# Patient Record
Sex: MALE | Employment: FULL TIME | URBAN - METROPOLITAN AREA
[De-identification: names, ages, dates, MRNs, and addresses within clinical notes are randomized per-mention and may not be internally consistent; named-entity substitution may affect disease eponyms.]

---

## 2017-01-06 ENCOUNTER — OFFICE VISIT - HEALTHEAST (OUTPATIENT)
Dept: FAMILY MEDICINE | Facility: CLINIC | Age: 42
End: 2017-01-06

## 2017-01-06 DIAGNOSIS — R05.9 COUGH: ICD-10-CM

## 2017-01-09 ENCOUNTER — COMMUNICATION - HEALTHEAST (OUTPATIENT)
Dept: FAMILY MEDICINE | Facility: CLINIC | Age: 42
End: 2017-01-09

## 2017-04-29 ENCOUNTER — COMMUNICATION - HEALTHEAST (OUTPATIENT)
Dept: FAMILY MEDICINE | Facility: CLINIC | Age: 42
End: 2017-04-29

## 2017-04-29 DIAGNOSIS — J34.89 RHINORRHEA: ICD-10-CM

## 2017-11-14 ENCOUNTER — OFFICE VISIT - HEALTHEAST (OUTPATIENT)
Dept: FAMILY MEDICINE | Facility: CLINIC | Age: 42
End: 2017-11-14

## 2017-11-14 DIAGNOSIS — J30.9 ALLERGIC RHINITIS: ICD-10-CM

## 2017-11-14 DIAGNOSIS — Z00.00 ROUTINE GENERAL MEDICAL EXAMINATION AT A HEALTH CARE FACILITY: ICD-10-CM

## 2017-11-14 DIAGNOSIS — E66.09 CLASS 1 OBESITY DUE TO EXCESS CALORIES WITH SERIOUS COMORBIDITY AND BODY MASS INDEX (BMI) OF 32.0 TO 32.9 IN ADULT: ICD-10-CM

## 2017-11-14 DIAGNOSIS — R05.9 COUGH: ICD-10-CM

## 2017-11-14 DIAGNOSIS — K21.9 GERD (GASTROESOPHAGEAL REFLUX DISEASE): ICD-10-CM

## 2017-11-14 DIAGNOSIS — E80.6 HYPERBILIRUBINEMIA: ICD-10-CM

## 2017-11-14 DIAGNOSIS — M25.473 ANKLE SWELLING: ICD-10-CM

## 2017-11-14 DIAGNOSIS — E66.811 CLASS 1 OBESITY DUE TO EXCESS CALORIES WITH SERIOUS COMORBIDITY AND BODY MASS INDEX (BMI) OF 32.0 TO 32.9 IN ADULT: ICD-10-CM

## 2017-11-14 LAB
CHOLEST SERPL-MCNC: 171 MG/DL
FASTING STATUS PATIENT QL REPORTED: YES
HDLC SERPL-MCNC: 39 MG/DL
LDLC SERPL CALC-MCNC: 107 MG/DL
TRIGL SERPL-MCNC: 125 MG/DL

## 2017-11-14 ASSESSMENT — MIFFLIN-ST. JEOR: SCORE: 1777.58

## 2017-11-15 ENCOUNTER — COMMUNICATION - HEALTHEAST (OUTPATIENT)
Dept: FAMILY MEDICINE | Facility: CLINIC | Age: 42
End: 2017-11-15

## 2017-12-08 ENCOUNTER — COMMUNICATION - HEALTHEAST (OUTPATIENT)
Dept: TELEHEALTH | Facility: CLINIC | Age: 42
End: 2017-12-08

## 2017-12-08 ENCOUNTER — OFFICE VISIT - HEALTHEAST (OUTPATIENT)
Dept: ALLERGY | Facility: CLINIC | Age: 42
End: 2017-12-08

## 2017-12-08 DIAGNOSIS — R68.2 DRY MOUTH: ICD-10-CM

## 2017-12-08 DIAGNOSIS — R09.81 NASAL CONGESTION: ICD-10-CM

## 2017-12-08 DIAGNOSIS — R05.9 COUGH: ICD-10-CM

## 2017-12-08 ASSESSMENT — MIFFLIN-ST. JEOR: SCORE: 1777.58

## 2019-12-18 ENCOUNTER — OFFICE VISIT - HEALTHEAST (OUTPATIENT)
Dept: FAMILY MEDICINE | Facility: CLINIC | Age: 44
End: 2019-12-18

## 2019-12-18 ENCOUNTER — COMMUNICATION - HEALTHEAST (OUTPATIENT)
Dept: TELEHEALTH | Facility: CLINIC | Age: 44
End: 2019-12-18

## 2019-12-18 DIAGNOSIS — R07.89 ATYPICAL CHEST PAIN: ICD-10-CM

## 2019-12-18 DIAGNOSIS — K21.9 GASTROESOPHAGEAL REFLUX DISEASE WITHOUT ESOPHAGITIS: ICD-10-CM

## 2019-12-18 DIAGNOSIS — E66.09 CLASS 1 OBESITY DUE TO EXCESS CALORIES WITHOUT SERIOUS COMORBIDITY WITH BODY MASS INDEX (BMI) OF 30.0 TO 30.9 IN ADULT: ICD-10-CM

## 2019-12-18 DIAGNOSIS — E66.811 CLASS 1 OBESITY DUE TO EXCESS CALORIES WITHOUT SERIOUS COMORBIDITY WITH BODY MASS INDEX (BMI) OF 30.0 TO 30.9 IN ADULT: ICD-10-CM

## 2019-12-18 DIAGNOSIS — E78.5 DYSLIPIDEMIA: ICD-10-CM

## 2019-12-18 DIAGNOSIS — G43.109 MIGRAINE WITH AURA AND WITHOUT STATUS MIGRAINOSUS, NOT INTRACTABLE: ICD-10-CM

## 2019-12-18 DIAGNOSIS — E80.6 HYPERBILIRUBINEMIA: ICD-10-CM

## 2019-12-18 DIAGNOSIS — Z00.00 ROUTINE GENERAL MEDICAL EXAMINATION AT A HEALTH CARE FACILITY: ICD-10-CM

## 2019-12-18 DIAGNOSIS — R41.3 MEMORY LOSS: ICD-10-CM

## 2019-12-18 LAB
ALBUMIN SERPL-MCNC: 4.5 G/DL (ref 3.5–5)
ALP SERPL-CCNC: 79 U/L (ref 45–120)
ALT SERPL W P-5'-P-CCNC: 16 U/L (ref 0–45)
ANION GAP SERPL CALCULATED.3IONS-SCNC: 9 MMOL/L (ref 5–18)
AST SERPL W P-5'-P-CCNC: 14 U/L (ref 0–40)
BILIRUB SERPL-MCNC: 2.8 MG/DL (ref 0–1)
BUN SERPL-MCNC: 16 MG/DL (ref 8–22)
CALCIUM SERPL-MCNC: 9.6 MG/DL (ref 8.5–10.5)
CHLORIDE BLD-SCNC: 102 MMOL/L (ref 98–107)
CHOLEST SERPL-MCNC: 148 MG/DL
CO2 SERPL-SCNC: 28 MMOL/L (ref 22–31)
CREAT SERPL-MCNC: 0.82 MG/DL (ref 0.7–1.3)
ERYTHROCYTE [DISTWIDTH] IN BLOOD BY AUTOMATED COUNT: 12.1 % (ref 11–14.5)
FASTING STATUS PATIENT QL REPORTED: YES
GFR SERPL CREATININE-BSD FRML MDRD: >60 ML/MIN/1.73M2
GLUCOSE BLD-MCNC: 85 MG/DL (ref 70–125)
HCT VFR BLD AUTO: 48.8 % (ref 40–54)
HDLC SERPL-MCNC: 42 MG/DL
HGB BLD-MCNC: 17.2 G/DL (ref 14–18)
LDLC SERPL CALC-MCNC: 91 MG/DL
MCH RBC QN AUTO: 33.3 PG (ref 27–34)
MCHC RBC AUTO-ENTMCNC: 35.2 G/DL (ref 32–36)
MCV RBC AUTO: 95 FL (ref 80–100)
PLATELET # BLD AUTO: 141 THOU/UL (ref 140–440)
PMV BLD AUTO: 9.5 FL (ref 7–10)
POTASSIUM BLD-SCNC: 4.1 MMOL/L (ref 3.5–5)
PROT SERPL-MCNC: 7.4 G/DL (ref 6–8)
RBC # BLD AUTO: 5.16 MILL/UL (ref 4.4–6.2)
SODIUM SERPL-SCNC: 139 MMOL/L (ref 136–145)
TRIGL SERPL-MCNC: 76 MG/DL
TSH SERPL DL<=0.005 MIU/L-ACNC: 0.89 UIU/ML (ref 0.3–5)
VIT B12 SERPL-MCNC: 301 PG/ML (ref 213–816)
WBC: 6.2 THOU/UL (ref 4–11)

## 2019-12-18 ASSESSMENT — MIFFLIN-ST. JEOR: SCORE: 1727.1

## 2019-12-19 LAB
ATRIAL RATE - MUSE: 52 BPM
DIASTOLIC BLOOD PRESSURE - MUSE: NORMAL
HIV 1+2 AB+HIV1 P24 AG SERPL QL IA: NEGATIVE
INTERPRETATION ECG - MUSE: NORMAL
P AXIS - MUSE: 26 DEGREES
PR INTERVAL - MUSE: 178 MS
QRS DURATION - MUSE: 92 MS
QT - MUSE: 440 MS
QTC - MUSE: 409 MS
R AXIS - MUSE: 41 DEGREES
SYSTOLIC BLOOD PRESSURE - MUSE: NORMAL
T AXIS - MUSE: 9 DEGREES
VENTRICULAR RATE- MUSE: 52 BPM

## 2020-04-24 ENCOUNTER — COMMUNICATION - HEALTHEAST (OUTPATIENT)
Dept: FAMILY MEDICINE | Facility: CLINIC | Age: 45
End: 2020-04-24

## 2020-04-24 ENCOUNTER — OFFICE VISIT - HEALTHEAST (OUTPATIENT)
Dept: FAMILY MEDICINE | Facility: CLINIC | Age: 45
End: 2020-04-24

## 2020-04-24 DIAGNOSIS — L73.9 FOLLICULITIS: ICD-10-CM

## 2020-07-08 ENCOUNTER — COMMUNICATION - HEALTHEAST (OUTPATIENT)
Dept: SCHEDULING | Facility: CLINIC | Age: 45
End: 2020-07-08

## 2020-07-09 ENCOUNTER — OFFICE VISIT - HEALTHEAST (OUTPATIENT)
Dept: FAMILY MEDICINE | Facility: CLINIC | Age: 45
End: 2020-07-09

## 2020-07-09 DIAGNOSIS — M79.89 SWELLING OF LIMB: ICD-10-CM

## 2020-07-09 LAB
ALBUMIN SERPL-MCNC: 4.2 G/DL (ref 3.5–5)
ALP SERPL-CCNC: 79 U/L (ref 45–120)
ALT SERPL W P-5'-P-CCNC: 23 U/L (ref 0–45)
ANION GAP SERPL CALCULATED.3IONS-SCNC: 8 MMOL/L (ref 5–18)
AST SERPL W P-5'-P-CCNC: 18 U/L (ref 0–40)
BILIRUB SERPL-MCNC: 1.4 MG/DL (ref 0–1)
BUN SERPL-MCNC: 14 MG/DL (ref 8–22)
C REACTIVE PROTEIN LHE: 0.1 MG/DL (ref 0–0.8)
CALCIUM SERPL-MCNC: 9.4 MG/DL (ref 8.5–10.5)
CHLORIDE BLD-SCNC: 103 MMOL/L (ref 98–107)
CO2 SERPL-SCNC: 28 MMOL/L (ref 22–31)
CREAT SERPL-MCNC: 0.81 MG/DL (ref 0.7–1.3)
ERYTHROCYTE [SEDIMENTATION RATE] IN BLOOD BY WESTERGREN METHOD: 2 MM/HR (ref 0–15)
GFR SERPL CREATININE-BSD FRML MDRD: >60 ML/MIN/1.73M2
GLUCOSE BLD-MCNC: 95 MG/DL (ref 70–125)
POTASSIUM BLD-SCNC: 4.1 MMOL/L (ref 3.5–5)
PROT SERPL-MCNC: 7.1 G/DL (ref 6–8)
RHEUMATOID FACT SERPL-ACNC: <15 IU/ML (ref 0–30)
SODIUM SERPL-SCNC: 139 MMOL/L (ref 136–145)

## 2020-12-29 ENCOUNTER — OFFICE VISIT - HEALTHEAST (OUTPATIENT)
Dept: FAMILY MEDICINE | Facility: CLINIC | Age: 45
End: 2020-12-29

## 2020-12-29 DIAGNOSIS — Z00.00 ROUTINE HISTORY AND PHYSICAL EXAMINATION OF ADULT: ICD-10-CM

## 2020-12-29 DIAGNOSIS — K21.9 GASTROESOPHAGEAL REFLUX DISEASE WITHOUT ESOPHAGITIS: ICD-10-CM

## 2020-12-29 DIAGNOSIS — E78.5 DYSLIPIDEMIA: ICD-10-CM

## 2020-12-29 DIAGNOSIS — E80.6 HYPERBILIRUBINEMIA: ICD-10-CM

## 2020-12-29 DIAGNOSIS — Z11.59 NEED FOR HEPATITIS C SCREENING TEST: ICD-10-CM

## 2020-12-29 LAB
ALBUMIN SERPL-MCNC: 4.3 G/DL (ref 3.5–5)
ALP SERPL-CCNC: 68 U/L (ref 45–120)
ALT SERPL W P-5'-P-CCNC: 21 U/L (ref 0–45)
ANION GAP SERPL CALCULATED.3IONS-SCNC: 10 MMOL/L (ref 5–18)
AST SERPL W P-5'-P-CCNC: 15 U/L (ref 0–40)
BILIRUB SERPL-MCNC: 1.5 MG/DL (ref 0–1)
BUN SERPL-MCNC: 28 MG/DL (ref 8–22)
CALCIUM SERPL-MCNC: 9.1 MG/DL (ref 8.5–10.5)
CHLORIDE BLD-SCNC: 106 MMOL/L (ref 98–107)
CHOLEST SERPL-MCNC: 172 MG/DL
CO2 SERPL-SCNC: 28 MMOL/L (ref 22–31)
CREAT SERPL-MCNC: 0.91 MG/DL (ref 0.7–1.3)
ERYTHROCYTE [DISTWIDTH] IN BLOOD BY AUTOMATED COUNT: 12 % (ref 11–14.5)
FASTING STATUS PATIENT QL REPORTED: YES
GFR SERPL CREATININE-BSD FRML MDRD: >60 ML/MIN/1.73M2
GLUCOSE BLD-MCNC: 91 MG/DL (ref 70–125)
HCT VFR BLD AUTO: 51.6 % (ref 40–54)
HDLC SERPL-MCNC: 45 MG/DL
HGB BLD-MCNC: 17.3 G/DL (ref 14–18)
LDLC SERPL CALC-MCNC: 109 MG/DL
MCH RBC QN AUTO: 32.4 PG (ref 27–34)
MCHC RBC AUTO-ENTMCNC: 33.4 G/DL (ref 32–36)
MCV RBC AUTO: 97 FL (ref 80–100)
PLATELET # BLD AUTO: 168 THOU/UL (ref 140–440)
PMV BLD AUTO: 9.1 FL (ref 7–10)
POTASSIUM BLD-SCNC: 4.2 MMOL/L (ref 3.5–5)
PROT SERPL-MCNC: 7.3 G/DL (ref 6–8)
RBC # BLD AUTO: 5.33 MILL/UL (ref 4.4–6.2)
SODIUM SERPL-SCNC: 144 MMOL/L (ref 136–145)
TRIGL SERPL-MCNC: 92 MG/DL
WBC: 6.2 THOU/UL (ref 4–11)

## 2020-12-29 ASSESSMENT — MIFFLIN-ST. JEOR: SCORE: 1767.93

## 2020-12-30 LAB — HCV AB SERPL QL IA: NEGATIVE

## 2021-04-19 ENCOUNTER — AMBULATORY - HEALTHEAST (OUTPATIENT)
Dept: NURSING | Facility: CLINIC | Age: 46
End: 2021-04-19

## 2021-05-10 ENCOUNTER — AMBULATORY - HEALTHEAST (OUTPATIENT)
Dept: NURSING | Facility: CLINIC | Age: 46
End: 2021-05-10

## 2021-05-26 ENCOUNTER — RECORDS - HEALTHEAST (OUTPATIENT)
Dept: ADMINISTRATIVE | Facility: CLINIC | Age: 46
End: 2021-05-26

## 2021-05-30 VITALS — WEIGHT: 195.38 LBS

## 2021-05-31 VITALS — BODY MASS INDEX: 32.49 KG/M2 | WEIGHT: 207 LBS | HEIGHT: 67 IN

## 2021-05-31 VITALS — HEIGHT: 67 IN | BODY MASS INDEX: 32.49 KG/M2 | WEIGHT: 207 LBS

## 2021-06-03 VITALS
DIASTOLIC BLOOD PRESSURE: 60 MMHG | SYSTOLIC BLOOD PRESSURE: 110 MMHG | HEIGHT: 67 IN | OXYGEN SATURATION: 98 % | BODY MASS INDEX: 30.61 KG/M2 | HEART RATE: 67 BPM | WEIGHT: 195 LBS

## 2021-06-04 VITALS
HEART RATE: 53 BPM | BODY MASS INDEX: 30.24 KG/M2 | WEIGHT: 194.5 LBS | SYSTOLIC BLOOD PRESSURE: 129 MMHG | OXYGEN SATURATION: 97 % | TEMPERATURE: 97.7 F | DIASTOLIC BLOOD PRESSURE: 85 MMHG

## 2021-06-04 NOTE — PROGRESS NOTES
"     Assessment/Plan:     1. Routine general medical examination at a health care facility  Routine healthcare maintenance.  Preventative cares reviewed.  Routine HIV screen based on age criteria.  Tetanus booster provided today.  Declines flu shot.  Annual physical exams to continue.  - HIV Antigen/Antibody Screening Cascade  - Td, Preservative Free (green label)    2. Class 1 obesity due to excess calories without serious comorbidity with body mass index (BMI) of 30.0 to 30.9 in adult  Patient has lost 12 pounds since November 14, 2017 and is exercising more consistently.  Dietary and exercise modification for weight goal less than 190 pounds initially, less than 180 pounds ideally.  - Lipid Cascade    3. Atypical chest pain  Atypical chest pain.  Mid chest.  Question musculoskeletal etiology.  Patient declines chest x-ray.  Electrocardiogram appears unremarkable other than ventricular rate of 52 bpm.  Will notify of persistent concerns or recurrence.  Please see comments below regarding reflux management.  - Electrocardiogram Perform and Read  - HM2(CBC w/o Differential)  - Comprehensive Metabolic Panel    4. Gastroesophageal reflux disease without esophagitis  Patient has used Tums OTC as needed.  Will use omeprazole 20 mg daily x14 days then as needed basis following.  Notify of atypical chest pain as noted above persist.    5. Hyperbilirubinemia  History of hyperbilirubinemia with bilirubin level around 2.3.  Repeat labs as noted to ensure stable or improved with likely Gilbert's syndrome.    6. Dyslipidemia  History of dyslipidemia noted.  Check lipid cascade today while fasting.  Therapeutic lifestyle changes reviewed as noted above.  - Lipid [place]    7. Migraine with aura and without status migrainosus, not intractable  Migraine with aura.  Described as \"ocular migraine\".  No significant nausea or vomiting.  Headaches not intractable.  Will monitor currently and keep headache diary.    8. Memory " "loss  Some memory loss issues.  CBC, comprehensive metabolic panel, vitamin B12 and TSH pending.  Will notify with results.  Patient declines further evaluation by neurologist.  - Vitamin B12  - Thyroid Stimulating Hormone (TSH)       I have had an Advance Directives discussion with the patient.  The following high BMI interventions were performed this visit: encouragement to exercise, weight monitoring, weight loss from baseline weight and lifestyle education regarding diet.  Ensure ongoing efforts to achieve weight goal < 190 pounds initially, < 180 pounds ideally.              Subjective:      Gomez Villegas is a 44 y.o. male who presents for an annual exam.  Has had some memory issues.  Forgetful that they have done something like feed the dog.  About 5 episodes in the last month.  Has had some ocular migraine headache concerns as well.  Does not require significant analgesics however.  Symptoms typically self-limited after about 30 minutes of visual acuity change.  Not constant.  Does have atypical chest pain more mid chest.  Does not move around.  Is just \"there\".  Not worse with exertion.  Can get heart rate up to 194 bpm while working out at the gym.  Uses Tums tablets for reflux management.  Had evaluation for possible allergies however allergy testing was normal.  Cough likely secondary to reflux per Dr. Rachael Marc.  Comprehensive review of systems as above otherwise all negative.     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  Allergy testing was negative.  I " think his cough is likely due to his reflux.  If symptoms do not improve, could consider ENT. Thank you, Rachael Marc    Healthy Habits:   Regular Exercise: Yes  Healthy Diet: Yes  Dental Visits Regularly: Yes  Seat Belt: Yes   Sexually active: Yes  Colonoscopy: N/A  Lipid Profile: Yes  Glucose Screen: Yes    Immunization History   Administered Date(s) Administered     DT (pediatric) 03/16/2004     Hep B, historic 05/06/2011     Td,adult,historic,unspecified 03/16/2004     Tdap 06/16/2010     Immunization status: needs Td booster, declines flu shot.  Vision Screening:both eyes  Hearing: PASS     Current Outpatient Medications   Medication Sig Dispense Refill     fluticasone (FLONASE) 50 mcg/actuation nasal spray SHAKE LIQUID AND USE 2 SPRAYS IN EACH NOSTRIL DAILY 48 g 1     No current facility-administered medications for this visit.      History reviewed. No pertinent past medical history.  History reviewed. No pertinent surgical history.  Patient has no known allergies.  No family history on file.  Social History     Socioeconomic History     Marital status:      Spouse name: Not on file     Number of children: Not on file     Years of education: Not on file     Highest education level: Not on file   Occupational History     Not on file   Social Needs     Financial resource strain: Not on file     Food insecurity:     Worry: Not on file     Inability: Not on file     Transportation needs:     Medical: Not on file     Non-medical: Not on file   Tobacco Use     Smoking status: Never Smoker     Smokeless tobacco: Never Used   Substance and Sexual Activity     Alcohol use: No     Drug use: Not on file     Sexual activity: Not on file   Lifestyle     Physical activity:     Days per week: Not on file     Minutes per session: Not on file     Stress: Not on file   Relationships     Social connections:     Talks on phone: Not on file     Gets together: Not on file     Attends [employer] service: Not on file      "Active member of club or organization: Not on file     Attends meetings of clubs or organizations: Not on file     Relationship status: Not on file     Intimate partner violence:     Fear of current or ex partner: Not on file     Emotionally abused: Not on file     Physically abused: Not on file     Forced sexual activity: Not on file   Other Topics Concern     Not on file   Social History Narrative     Not on file       Review of Systems  Comprehensive ROS: as above, otherwise all negative.           Objective:     /60   Pulse 67   Ht 5' 7.25\" (1.708 m)   Wt 195 lb (88.5 kg)   SpO2 98%   BMI 30.31 kg/m    Body mass index is 30.31 kg/m .    Physical    General Appearance:    Alert, cooperative, no distress, appears stated age.  Obesity with BMI 30.31   Head:    Normocephalic, without obvious abnormality, atraumatic   Eyes:    PERRL, conjunctiva/corneas clear, EOM's intact, fundi     benign, both eyes        Ears:    Normal TM's and external ear canals, both ears   Nose:   Nares normal, septum midline, mucosa normal, no drainage    or sinus tenderness   Throat:   Lips, mucosa, and tongue normal; teeth and gums normal   Neck:   Supple, symmetrical, trachea midline, no adenopathy;        thyroid:  No enlargement/tenderness/nodules; no carotid    bruit or JVD   Back:     Symmetric, no curvature, ROM normal, no CVA tenderness   Lungs:     Clear to auscultation bilaterally, respirations unlabored   Chest wall:    No tenderness or deformity   Heart:    Regular rate and rhythm, S1 and S2 normal, no murmur, rub   or gallop   Abdomen:     Soft, non-tender, bowel sounds active all four quadrants,     no masses, no organomegaly.     Genitalia:    Normal male without lesion, discharge or tenderness.  No inguinal hernia noted.     Rectal:    deferred   Extremities:   Extremities normal, atraumatic, no cyanosis or edema   Pulses:   2+ and symmetric all extremities   Skin:   Skin color, texture, turgor normal, no rashes " or lesions   Lymph nodes:   Cervical, supraclavicular, and axillary nodes normal   Neurologic:   CNII-XII intact. Normal strength, sensation and reflexes       throughout       Electrocardiogram with sinus bradycardia with ventricular rate 52 bpm otherwise normal EKG.           This note has been dictated using voice recognition software and as a result may contain minor grammatical errors and unintended word substitutions.    ambulatory

## 2021-06-05 VITALS
DIASTOLIC BLOOD PRESSURE: 60 MMHG | HEIGHT: 67 IN | HEART RATE: 59 BPM | WEIGHT: 204 LBS | SYSTOLIC BLOOD PRESSURE: 110 MMHG | BODY MASS INDEX: 32.02 KG/M2 | TEMPERATURE: 97.7 F | OXYGEN SATURATION: 96 %

## 2021-06-07 NOTE — PROGRESS NOTES
"Gomez Villegas is a 45 y.o. male who is being evaluated via a billable video visit.      The patient has been notified of following:     \"This video visit will be conducted via a call between you and your physician/provider. We have found that certain health care needs can be provided without the need for an in-person physical exam.  This service lets us provide the care you need with a video conversation.  If a prescription is necessary we can send it directly to your pharmacy.  If lab work is needed we can place an order for that and you can then stop by our lab to have the test done at a later time.    Video visits are billed at different rates depending on your insurance coverage. Please reach out to your insurance provider with any questions.    If during the course of the call the physician/provider feels a video visit is not appropriate, you will not be charged for this service.\"    Patient has given verbal consent to a Video visit? Yes    Patient would like to receive their AVS by AVS Preference: Sydnee.    Patient would like the video invitation sent by: Text to cell phone: [phone]    Will anyone else be joining your video visit? No        Video Start Time: 1:54 pm    Assessment/Plan:    1. Folliculitis  We discussed potential etiologies of the rash.  From what I can see on video visit, it does not appear classic for shingles.  The description of the clinical course also does not seem to reflect shingles, although this is still a possibility.  I am more suspicious of a folliculitis type of lesion given the location and appearance of the rash.  Will treat with a seven-day course of doxycycline 100 mg by mouth twice daily.  He will follow-up in 1-2 weeks if he has any persisting or worsening symptoms.  - doxycycline (MONODOX) 100 MG capsule; Take 1 capsule (100 mg total) by mouth 2 (two) times a day for 7 days.  Dispense: 14 capsule; Refill: 0      Subjective:    Gomez Villegas is a 45 year old male here " "today for a viseo visit to discuss rash and irritation on his neck.  Patient first noticed an irritation on the right side of his neck about 4 days ago.  Initially thought he had a reaction to something outside.  He noticed irritation and some discomfort when his shirt collar or \"would rub against it.  It is somewhat red with some raised bumps.  Occasionally is itchy but not very painful unless something is rubbing against it.  He describes small bumps that resemble pimples.  He denies having rash or lesions similar to this elsewhere on his body.  He is not having any fevers body aches or other systemic symptoms.  He tried using some topical hydrocortisone cream with little relief.  The area of the rash is not spreading but is also not improving at all.  Patient notes having history of shingles.  This rash does not appear as shingles did for him.  He describes large blisters when he had shingles on his back years ago.  He does have raised bumps but it seems different than his shingles to him.  Review of systems is as stated in HPI, and the remainder of the 10 system review is otherwise unremarkable.    Past Medical History, Family History, and Social History reviewed.    No past surgical history on file.     No family history on file.     No past medical history on file.     Social History     Tobacco Use     Smoking status: Never Smoker     Smokeless tobacco: Never Used   Substance Use Topics     Alcohol use: No     Drug use: Not on file        Current Outpatient Medications   Medication Sig Dispense Refill     doxycycline (MONODOX) 100 MG capsule Take 1 capsule (100 mg total) by mouth 2 (two) times a day for 7 days. 14 capsule 0     No current facility-administered medications for this visit.         Video-Visit Details    Type of service:  Video Visit    Video End Time (time video stopped): 2:08 PM  Originating Location (pt. Location): Home    Distant Location (provider location):  [facility] MEDICINE/OB "     Mode of Communication:  Video Conference via [employer]      Summer Gutiérrez, CNP

## 2021-06-08 NOTE — PROGRESS NOTES
Assessment  1. Cough  XR Chest PA and Lateral    Bordetella pertussis PCR    albuterol nebulizer solution 3 mL (PROVENTIL)    Nebulizer       Plan    1.  Cough: Chest x-ray performed in clinic and was negative.  Pertussis swab obtained.  Patient received albuterol nebulizer treatment in clinic.  No significant change in cough or lung exam following nebulizer treatment.  Considered differential diagnosis of GERD, asthma, infection and post-nasal drainage as etiology of his cough.  He is not complaining of any acid reflux symptoms.  Does not have significant improvement with albuterol nebulizer treatment in clinic.  Did not respond to antibiotic treatment and no sign of acute infection on exam today.  Discussed with patient that I suspect that his cough is caused by some sinus drainage.  He does have some redness and swelling of the nasal mucosa and does complain of occasional rhinorrhea and postnasal drainage.  Recommend trial of fluticasone nasal spray.  Prescription was sent to pharmacy.  Counseled on use of medication.  Also suggested using a humidifier at home to see if this helps reduce his symptoms.  Follow up with primary care provider as needed if cough does not improve with these measures.      Subjective  Gomez Villegas is a 41 y.o. male who comes in today with concern about ongoing cough.  Reports that cough started about 8 weeks ago.  Initially had a little bit of a head cold with some congestion at first.  That has resolved.  Cough has persisted.  He continues to have some occasional rhinorrhea and postnasal drainage.  This morning his right ear feels like there is fluid within it.  He has not had fevers or chills.  No sore throat.  No sinus pain or pressure.  Cough is productive of some phlegm.  He has no dyspnea.  No chest tightness or wheezing.  He is not a smoker.  He does report that his primary care provider diagnosed him with exercise-induced asthma several years ago.  However it has not been a  significant problem for him and he does not have an inhaler at home.  He did come into clinic for evaluation about 3 weeks ago in regards to this cough.  Was treated empirically with azithromycin and Tessalon Perles.  Completed those medications and did not feel they helped improve his symptoms.  He reports no history of environmental allergies or other exposures.  No family members are sick at home with similar symptoms.  He denies symptoms of esophageal reflux.  We reviewed allergies and medications.  He has no other concerns or questions today.    Current Outpatient Prescriptions   Medication Sig Dispense Refill     fluticasone (FLONASE) 50 mcg/actuation nasal spray 2 sprays into each nostril daily. 16 g 2     No current facility-administered medications for this visit.          Objective   Visit Vitals     /68 (Patient Site: Left Arm, Patient Position: Sitting, Cuff Size: Adult Large)     Pulse 68     Temp 98.9  F (37.2  C) (Tympanic)     Wt 195 lb 6 oz (88.6 kg)     SpO2 96%         Gen: alert, no acute distress  HEENT;  NCAT, TMs normal, erythema of nasal mucosa,  OP clear  Neck: supple, no lymphadenopathy, thyroid normal  CV: RRR, no murmur  Resp: CTAB, no wheeze/crackles  Ext: warm, dry, no edema      Results: Chest x-ray was performed in clinic.  I personally reviewed this chest x-ray.  No evidence of infiltrate or other cardiopulmonary abnormalities present.

## 2021-06-09 NOTE — PROGRESS NOTES
Assessment:   1. Swelling of limb    Edema secondary to to possible allergic reaction. Discussed diagnosis with patient and plan to obtain more laboratory studies.   - Sedimentation Rate  - C-Reactive Protein (CRP)  - Comprehensive Metabolic Panel  - Rheumatoid Factor Quant  - methylPREDNISolone (MEDROL DOSEPACK) 4 mg tablet; Take 1 tablet (4 mg total) by mouth daily for 6 days. Follow package directions  Dispense: 21 tablet; Refill: 0    Plan:   Recommendations: decrease sodium in the diet.  The patient was also instructed to call IMMEDIATELY (i.e., day or night) if any cardiopulmonary symptoms occur, especially chest pain, shortness of breath, dyspnea on exertion, paroxysmal nocturnal dyspnea, or orthopnea, and these were explained.  Follow up in 5 days and as needed.     Subjective:   Gomez Villegas is a 45 y.o. male who presents for evaluation of edema in his both arm. The edema has been mild to moderate. Onset of symptoms was 3 days ago, and patient reports symptoms have hsa remained the same since that time. The edema is present all day. The patient states the problem is new. The swelling has been aggravated by nothing. He reports that he works as jobs[employer] and he has worked in that same place before. Associated factors include: nothing. Cardiac risk factors: none.    The following portions of the patient's history were reviewed and updated as appropriate: allergies, current medications, past family history, past medical history, past social history, past surgical history and problem list.    Review of Systems  A 12 point comprehensive review of systems was negative except as noted.     Objective:   /85   Pulse (!) 53   Temp 97.7  F (36.5  C) (Oral)   Wt 194 lb 8 oz (88.2 kg)   SpO2 97%   BMI 30.24 kg/m    General appearance: alert, appears stated age and cooperative  Head: Normocephalic, without obvious abnormality, atraumatic  Eyes: conjunctivae/corneas clear. PERRL, EOM's intact. Fundi  benign.  Lungs: clear to auscultation bilaterally  Heart: regular rate and rhythm, S1, S2 normal, no murmur, click, rub or gallop  Extremities: edema of both hands mostly on the right hand  Pulses: 2+ and symmetric  Skin: Skin color, texture, turgor normal. No rashes or lesions  Lymph nodes: Cervical, supraclavicular, and axillary nodes normal.  Neurologic: Grossly normal

## 2021-06-09 NOTE — TELEPHONE ENCOUNTER
RN Triage:    Works outside with tools.    Noticed bilateral mild-mod hand swelling yesterday.  Does not interfere with range of motion.  Is able to  objects as usual.  Ring is tight.  No breathing difficulty.  Denies skin discoloration or pain.  No precipitating cause that he is aware of.  Appointment scheduled in clinic tomorrow.    Prisca Vega RN    [employer]          Reason for Disposition    MODERATE hand swelling (e.g., visible swelling of hand and fingers; pitting edema)    Additional Information    Negative: Severe difficulty breathing (e.g., struggling for each breath, speaks in single words)    Negative: Sounds like a life-threatening emergency to the triager    Negative: Followed a hand injury    Negative: Swelling followed an insect bite to the area    Negative: Swelling followed a bee, wasp, or other sting to the area    Negative: Swelling of arm is main problem    Negative: Swelling of wrist is main symptom    Negative: Cast problems or questions    Negative: Pain, redness, swelling, or pus at IV Site    Negative: Difficulty breathing at rest    Negative: Looks like a broken bone or dislocated joint (e.g., crooked or deformed)    Negative: Entire hand is cool or blue in comparison to other side    Negative: [1] Can't use hand or can barely use hand AND [2] new onset    Negative: [1] Difficulty breathing with exertion (e.g., walking) AND [2] new onset or worsening    Negative: [1] Red area or streak AND [2] fever    Negative: [1] Swelling is painful to touch AND [2] fever    Negative: [1] Cast arm AND [2] now increased pain    Negative: [1] Postpartum (i.e. < 6 weeks since delivery) AND [2] new blurred vision or vision changes    Negative: [1] Postpartum (i.e. < 6 weeks since delivery) AND [2] face swelling    Negative: Patient sounds very sick or weak to the triager    Negative: [1] Pregnant > 20 weeks AND [2] face swelling    Negative: [1] Pregnant > 20 weeks AND [2] new  blurred vision or vision changes    Negative: SEVERE hand swelling (e.g., swelling of entire hand and up into forearm)    Negative: [1] Red area or streak [2] large (> 2 in. or 5 cm)    Protocols used: HAND SWELLING-A-AH

## 2021-06-14 NOTE — PROGRESS NOTES
"Chief complaint: Cough    History of present illness: This is a pleasant 42-year-old gentleman who is here today for evaluation of cough.  He states he has had the cough every fall for the last few years.  He describes the cough is hacking.  He states he does not cough to the point of throwing up or gagging himself.  He has no shortness of breath or wheeze with the cough.  He was given a Ventolin inhaler in the past to use with exercise.  He tried it with the cough that he did not make much of a difference in symptoms.  He denies any cough in his sleep.  He describes the cough is coming more from his throat.  It worsens with talking.  He feels that his mouth is dry.  Occasionally has dry eyes as well.  He states the cough drops or gum seem to relieve the symptoms.  He has been taking Flonase nasal spray for the last year but is not sure it is helping.  He does have a history of reflux and was started on Prilosec.  He is not using the medication 30 minutes prior to eating, however.  He is noted no improvement in the symptoms since starting the reflux medication.    Past medical history: Otherwise unremarkable    Social history: He lives in a home that was built in 1964, has radiator heat, no pets, no central air, no visible mold, non-smoker    Family history: Negative for allergies and asthma    Review of Systems performed as above and the remainder is negative.      Current Outpatient Prescriptions:      fluticasone (FLONASE) 50 mcg/actuation nasal spray, SHAKE LIQUID AND USE 2 SPRAYS IN EACH NOSTRIL DAILY, Disp: 48 g, Rfl: 1    No Known Allergies    /80  Pulse 80  Resp 17  Ht 5' 7\" (1.702 m)  Wt 207 lb (93.9 kg)  BMI 32.42 kg/m2  Gen: Pleasant male not in acute distress  HEENT: Eyes no erythema of the bulbar or palpebral conjunctiva, no edema. Ears: TMs well visualized, no effusions. Nose: No congestion, mucosa normal. Mouth: Throat clear, no lip or tongue edema.   Cardiac: Regular rate and rhythm, no " murmurs, rubs or gallops  Respiratory: Clear to auscultation bilaterally, no adventitious breath sounds  Lymph: No supraclavicular or cervical lymphadenopathy  Skin: No rashes or lesions  Psych: Alert and oriented times 3    Last Percutaneous Allergy Test Results  Trees  Savage, White  1:20 H  (W/F in mm): 0 (12/08/17 1553)  Birch Mix 1:20 H (W/F in mm): 0 (12/08/17 1553)  [place], Common 1:20 H (W/F in mm): 0 (12/08/17 1553)  Elm, American 1:20 H (W/F in mm): 0 (12/08/17 1553)  Franklin, Shagbark 1:20 H (W/F in mm): 0 (12/08/17 1553)  Maple, Hard/Sugar 1:20 H (W/F in mm): 0 (12/08/17 1553)  Whiting Mix 1:20 H (W/F in mm): 0 (12/08/17 1553)  [place], Red 1:20 H (W/F in mm): 0 (12/08/17 1553)  Maybee, American 1:20 H (W/F in mm): 0 (12/08/17 1553)  [place] Tree 1:20 H (W/F in mm): 0 (12/08/17 1553)  Dust Mites  D. Pteronyssinus Mite 30,000 AU/ML H (W/F in mm): 0 (12/08/17 1553)  D. Farinae Mite 30,000 AU/ML H (W/F in mm: 0 (12/08/17 1553)  Grasses  Grass Mix #4 10,000 BAU/ML H: 0 (12/08/17 1553)  Quoc Grass 1:20 H (W/F in mm): 0 (12/08/17 1553)  Cockroach  Cockroach Mix 1:10 H (W/F in mm): 4/10 (12/08/17 1553)  Molds/Fungi  Alternaria Tenuis 1:10 H (W/F in mm): 0 (12/08/17 1553)  Aspergillus Fumigatus 1:10 H (W/F in mm): 0 (12/08/17 1553)  Homodendrum Cladosporioides 1:10 H (W/F in mm): 0 (12/08/17 1553)  Penicillin Notatum 1:10 H (W/F in mm): 0 (12/08/17 1553)  Epicoccum 1:10 H (W/F in mm): 0 (12/08/17 1553)  Weeds  Ragweed, Short 1:20 H (W/F in mm): 0 (12/08/17 1553)  Dock, Sorrel 1:20 H (W/F in mm): 0 (12/08/17 1553)  Lamb's Quarter 1:20 H (W/F in mm): 0 (12/08/17 1553)  Pigweed, Rough Red Root 1:20 H  (W/F in mm): 0 (12/08/17 1553)  Plantain, English 1:20 H  (W/F in mm): 0 (12/08/17 1553)  Sagebrush, Mugwort 1:20 H  (W/F in mm): 0 (12/08/17 1553)  Animal  Cat 10,000 BAU/ML H (W/F in mm): 0 (12/08/17 1553)  Dog 1:10 H (W/F in mm): 0 (12/08/17 1553)  Controls  Device Type: QUINTIP (12/08/17 1553)  Neg. control: 50%  Glycerine/Saline H (W/F in mm): 0 (12/08/17 1553)  Pos. control: Histamine 6mg/ML (W/F in mms): 7/35 (12/08/17 1553)    Impression report and plan:  1.  Chronic cough  2.  Reflux    Allergy testing was only positive to cockroach and I doubt this is causing his symptoms.  I recommended stopping fluticasone nasal spray to see if the cough worsens.  I suspect this is more due to reflux and instructed him to take the reflux medication 30 minutes prior to eating.  He is using Prilosec.  If symptoms do not improve, he could consider evaluation with ENT given the fact that his symptoms do worsen with talking.  He does have dry mouth.  Could consider evaluation for Sjogren's syndrome as well.  I did order an SSA and SSB which can be done at a later date if he so desires.  Follow as needed.

## 2021-06-14 NOTE — PROGRESS NOTES
Assessment:     1. Routine general medical examination at a health care facility     2. Class 1 obesity due to excess calories with serious comorbidity and body mass index (BMI) of 32.0 to 32.9 in adult  Comprehensive Metabolic Panel    Lipid [place]   3. Cough  Ambulatory referral to Allergy   4. Hyperbilirubinemia  Comprehensive Metabolic Panel   5. Allergic rhinitis     6. GERD (gastroesophageal reflux disease)     7. Ankle swelling          Plan:      Routine healthcare maintenance.  Preventative cares reviewed.  Patient declines flu shot otherwise immunizations reviewed and up-to-date.  Dietary and exercise modifications discussed for weight goal less than 200 pounds initially, less than 190 pounds ideally.  Did check lipid cascade today as well as fasting glucose.  Liver function tests regarding history of hyperbilirubinemia completed.  Continues fluticasone nasal spray for allergic rhinitis management.  Will use Prilosec 20 mg daily ×2 weeks for reflux then use ranitidine 150 mg twice daily as needed for reflux management if recurrent.  Patient describes trace ankle swelling without current findings for edema.  Due to seasonal cough worse in the fall did refer patient to [facility] allergy and asthma regarding further evaluation for potential asthma component.    The following high BMI interventions were performed this visit: encouragement to exercise, weight monitoring, weight loss from baseline weight and lifestyle education regarding diet.         Subjective:      Gomez Villegas is a 42 y.o. male who presents for an annual exam.  Not exercising on a consistent basis.  Some dietary indiscretions.  Weight gain noted.  Does have cough worse in the fall.  Better during the summer.  Recurring issues over the years.  Nonproductive.  No postnasal drainage however is using fluticasone nasal spray consistently.  Allergy testing completed previously July 20, 2012 with normal IgE results.  History of elevated  "bilirubin level 2.6, indirect fraction highest.  Describes some heartburn symptoms more base of throat and upper chest without exertional component.  Patient does not feel heart related.  Denies history of asthma.  Some ankle swelling at the end of the day.  Does not want flu shot.  Comprehensive review of systems as above otherwise all negative.     \"Emily\"   2 children living - Sole Hernandez.  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  Vision Screening:both eyes  Hearing: PASS     Current Outpatient Prescriptions   Medication Sig Dispense Refill     fluticasone (FLONASE) 50 mcg/actuation nasal spray SHAKE LIQUID AND USE 2 SPRAYS IN EACH NOSTRIL DAILY 48 g 1     No current facility-administered medications for this visit.      No past medical history on file.  No past surgical history on file.  Review of patient's allergies indicates no known allergies.  No family history on file.  Social History     Social History     Marital status:      Spouse name: N/A     Number of children: N/A     Years of education: N/A     Occupational History     Not on file.     Social History Main Topics     Smoking status: Never Smoker     Smokeless tobacco: Never " "Used     Alcohol use No     Drug use: Not on file     Sexual activity: Not on file     Other Topics Concern     Not on file     Social History Narrative       Review of Systems  Comprehensive ROS: as above, otherwise all negative.           Objective:     /70  Pulse 72  Ht 5' 7\" (1.702 m)  Wt 207 lb (93.9 kg)  BMI 32.42 kg/m2  Body mass index is 32.42 kg/(m^2).    Physical    General Appearance:    Alert, cooperative, no distress, appears stated age.  Obesity.    Head:    Normocephalic, without obvious abnormality, atraumatic   Eyes:    PERRL, conjunctiva/corneas clear, EOM's intact, fundi     benign, both eyes        Ears:    Normal TM's and external ear canals, both ears   Nose:   Nares normal, septum midline, mucosa normal, no drainage    or sinus tenderness   Throat:   Lips, mucosa, and tongue normal; teeth and gums normal   Neck:   Supple, symmetrical, trachea midline, no adenopathy;        thyroid:  No enlargement/tenderness/nodules; no carotid    bruit or JVD   Back:     Symmetric, no curvature, ROM normal, no CVA tenderness   Lungs:     Clear to auscultation bilaterally, respirations unlabored   Chest wall:    No tenderness or deformity   Heart:    Regular rate and rhythm, S1 and S2 normal, no murmur, rub   or gallop   Abdomen:     Soft, non-tender, bowel sounds active all four quadrants,     no masses, no organomegaly.     Genitalia:    Normal male without lesion, discharge or tenderness.  No inguinal hernia noted.     Rectal:    deferred   Extremities:   Extremities normal, atraumatic, no cyanosis or edema   Pulses:   2+ and symmetric all extremities   Skin:   Skin color, texture, turgor normal, no rashes or lesions   Lymph nodes:   Cervical, supraclavicular, and axillary nodes normal   Neurologic:   CNII-XII intact. Normal strength, sensation and reflexes       throughout                 "

## 2021-06-14 NOTE — PROGRESS NOTES
"     Assessment/Plan:     1. Routine history and physical examination of adult  Routine healthcare maintenance.  Preventative cares reviewed. Routine Hep C screen based on age criteria. Declines flu shot. Annual physical exams to continue.     2. Dyslipidemia  Diet and exercise modifications reviewed for weight goal less than 190 pounds initially, less than 180 pounds ideally.  - Lipid Profile    3. Hyperbilirubinemia  No jaundice.  Prior bilirubin levels around 2.4 consistent with Gilbert's syndrome.  - Comprehensive Metabolic Panel  - HM2(CBC w/o Differential)    4. Gastroesophageal reflux disease without esophagitis  Patient has used omeprazole as needed (x14 days at a time typically) with transient relief. Encouraged continuation of omeprazole along with lifestyle recommendations of weight loss, head elevated at bedtime, and refraining from foods just prior to bedtime.     5. Need for hepatitis C screening test  Routine Hep C screen based on age criteria.   - Hepatitis C Antibody (Anti-HCV)    6. Class 1 obesity due to excess calories without serious comorbidity with body mass index (BMI) 31.71 in adult    Encouraged regular exercise and healthy diet for weight goal less than 190 pounds initially, less than 180 pounds ideally.        7. Hyperbilirubinemia  History of hyperbilirubinemia with most recent bilirubin level around 1.4.  Repeat labs as noted to ensure stable or improved with likely Gilbert's syndrome.  - CMP      8. Migraine with aura and without status migrainosus, not intractable  Migraine with aura.  Described as \"ocular migraine\".  No significant nausea or vomiting.  Headaches not intractable.  Will monitor currently and keep headache diary. 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   -Recommended NSAIDs as needed for flare up     9. Memory loss  Some memory loss issues.    Stable.  CBC, comprehensive metabolic panel, vitamin B12 and TSH last year were overall unremarkable.  Will " "monitor for worsening symptoms.     10. Cerumen Removal, Bilateral  Complete without complication      I have had an Advance Directives discussion with the patient.  Ensure ongoing efforts to achieve weight goal < 190 pounds initially, < 180 pounds ideally.            Subjective:      Gomez Villegas is a 45 y.o. male who presents for an annual exam. The patient states that he is doing well overall. He does report occasional \"heartburn\" symptoms which are relieved transiently with omeprazole. He states that he's had difficulty with memory loss, but nothing worsening or significant since last physical. He does state that he's had 3 migraine with aura (described as ocular migraines) since last year while driving which have lasted x30 minutes and resolved on their own. He states that he has otherwise been in his usual state of good health.  Comprehensive review of systems as above otherwise all negative.     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     No current facility-administered medications for this visit.      No past medical history on file.  No past surgical history on file.  Patient has no known allergies.  No family history on " "file.  Social History     Socioeconomic History     Marital status:      Spouse name: Not on file     Number of children: Not on file     Years of education: Not on file     Highest education level: Not on file   Occupational History     Not on file   Social Needs     Financial resource strain: Not on file     Food insecurity     Worry: Not on file     Inability: Not on file     Transportation needs     Medical: Not on file     Non-medical: Not on file   Tobacco Use     Smoking status: Never Smoker     Smokeless tobacco: Never Used   Substance and Sexual Activity     Alcohol use: No     Drug use: Not on file     Sexual activity: Not on file   Lifestyle     Physical activity     Days per week: Not on file     Minutes per session: Not on file     Stress: Not on file   Relationships     Social connections     Talks on phone: Not on file     Gets together: Not on file     Attends [employer] service: Not on file     Active member of club or organization: Not on file     Attends meetings of clubs or organizations: Not on file     Relationship status: Not on file     Intimate partner violence     Fear of current or ex partner: Not on file     Emotionally abused: Not on file     Physically abused: Not on file     Forced sexual activity: Not on file   Other Topics Concern     Not on file   Social History Narrative     Not on file       Review of Systems  Comprehensive ROS: as above, otherwise all negative.           Objective:     /60   Pulse (!) 59   Temp 97.7  F (36.5  C)   Ht 5' 7.25\" (1.708 m)   Wt 204 lb (92.5 kg)   SpO2 96%   BMI 31.71 kg/m    Body mass index is 31.71 kg/m .    Physical    General Appearance:    Alert, cooperative, no distress, appears stated age.     Head:    Normocephalic, without obvious abnormality, atraumatic   Eyes:    PERRL, conjunctiva/corneas clear, EOM's intact, fundi     benign, both eyes        Ears:    Normal TM's and external ear canals following cerumen removal, both " ears   Nose:   Nares normal, septum midline, mucosa normal, no drainage    or sinus tenderness   Throat:   Lips, mucosa, and tongue normal; teeth and gums normal   Neck:   Supple, symmetrical, trachea midline, no adenopathy;        thyroid:  No enlargement/tenderness/nodules; no carotid    bruit or JVD   Back:     Symmetric, no curvature, ROM normal, no CVA tenderness   Lungs:     Clear to auscultation bilaterally, respirations unlabored   Chest wall:    No tenderness or deformity   Heart:    Regular rate and rhythm, S1 and S2 normal, no murmur, rub   or gallop   Abdomen:     Soft, non-tender, bowel sounds active all four quadrants,     no masses, no organomegaly.     Genitalia:    Normal male without lesion, discharge or tenderness.  No inguinal hernia noted.     Extremities:   Extremities normal, atraumatic, no cyanosis or edema   Pulses:   2+ and symmetric all extremities   Skin:   Skin color, texture, turgor normal, no rashes or lesions   Lymph nodes:   Cervical, supraclavicular, and axillary nodes normal   Neurologic:   CNII-XII intact. Normal strength, sensation and reflexes       throughout                This note has been dictated using voice recognition software and as a result may contain minor grammatical errors and unintended word substitutions.

## 2021-06-27 ENCOUNTER — HEALTH MAINTENANCE LETTER (OUTPATIENT)
Age: 46
End: 2021-06-27

## 2021-07-01 ENCOUNTER — RECORDS - HEALTHEAST (OUTPATIENT)
Dept: GENERAL RADIOLOGY | Facility: CLINIC | Age: 46
End: 2021-07-01

## 2021-07-01 DIAGNOSIS — M25.561 PAIN IN RIGHT KNEE: ICD-10-CM

## 2021-07-01 DIAGNOSIS — S69.92XA UNSPECIFIED INJURY OF LEFT WRIST, HAND AND FINGER(S), INITIAL ENCOUNTER: ICD-10-CM

## 2021-07-03 NOTE — ADDENDUM NOTE
Addendum Note by Sherley Banks MLT at 12/18/2019  9:40 AM     Author: Sherley Banks MLT Service: -- Author Type:     Filed: 12/19/2019 11:35 AM Encounter Date: 12/18/2019 Status: Signed    : Sherley Banks MLT ()    Addended by: SHERLEY BANKS on: 12/19/2019 11:35 AM        Modules accepted: Orders

## 2021-07-03 NOTE — ADDENDUM NOTE
Addendum Note by Mindy Blanco at 12/18/2019  9:40 AM     Author: Mindy Blanco Service: -- Author Type:     Filed: 12/19/2019 12:02 PM Encounter Date: 12/18/2019 Status: Signed    : Mindy Blanco ()    Addended by: MINDY BLANCO on: 12/19/2019 12:02 PM        Modules accepted: Orders

## 2021-07-15 ENCOUNTER — HOSPITAL ENCOUNTER (OUTPATIENT)
Dept: MRI IMAGING | Facility: CLINIC | Age: 46
Discharge: HOME | End: 2021-07-15
Attending: FAMILY MEDICINE | Admitting: FAMILY MEDICINE
Payer: COMMERCIAL

## 2021-07-15 DIAGNOSIS — M25.561 ACUTE PAIN OF RIGHT KNEE: ICD-10-CM

## 2021-07-15 PROCEDURE — 73721 MRI JNT OF LWR EXTRE W/O DYE: CPT | Mod: RT

## 2021-07-18 DIAGNOSIS — S83.241D ACUTE MEDIAL MENISCUS TEAR OF RIGHT KNEE, SUBSEQUENT ENCOUNTER: Primary | ICD-10-CM

## 2021-08-03 ENCOUNTER — OFFICE VISIT (OUTPATIENT)
Dept: ORTHOPEDICS | Facility: CLINIC | Age: 46
End: 2021-08-03
Attending: FAMILY MEDICINE
Payer: COMMERCIAL

## 2021-08-03 VITALS
DIASTOLIC BLOOD PRESSURE: 68 MMHG | BODY MASS INDEX: 28.88 KG/M2 | WEIGHT: 184 LBS | HEIGHT: 67 IN | SYSTOLIC BLOOD PRESSURE: 102 MMHG

## 2021-08-03 DIAGNOSIS — S83.241D ACUTE MEDIAL MENISCUS TEAR OF RIGHT KNEE, SUBSEQUENT ENCOUNTER: ICD-10-CM

## 2021-08-03 PROCEDURE — 99203 OFFICE O/P NEW LOW 30 MIN: CPT | Performed by: ORTHOPAEDIC SURGERY

## 2021-08-03 ASSESSMENT — MIFFLIN-ST. JEOR: SCORE: 1677.21

## 2021-08-03 NOTE — PATIENT INSTRUCTIONS
1. Acute medial meniscus tear of right knee, subsequent encounter      Ice, rest, anti inflammatories     Follow up with Dr. Ley as needed    Call my office with any questions or concerns, [phone].

## 2021-08-03 NOTE — PROGRESS NOTES
CHIEF COMPLAINT: Right knee pain    DIAGNOSIS: Right knee small medial meniscus tear    OCCUPATION/SPORT:     HPI:   Gomez Villegas is a very pleasant 46 year old male who presents for evaluation of right knee pain. Symptoms started around 6/12/21. Unsure about a precipitating event but notes that he did take a fall around that time. The pain is located to the anteromedial knee. Worst pain is rated a 3 of 10, and current pain is rated at 1 of 10. Symptoms are worsened by sitting. Symptoms are improved with active. Associated symptoms include swelling, achy pain. Notably, the patient has had lateral meniscus repair 2001. No other concerns or complaints at this time.    PAST MEDICAL HISTORY:  1. None    CURRENT MEDICATIONS:  1. None    ALLERGIES:   NKDA    PAST SURGICAL HISTORY:  1. Right knee arthroscopic meniscal repair in the early 2000s (patient not sure where it was done)    FAMILY HISTORY: No known family history of bleeding, clotting, or anesthesia related complications.    SOCIAL HISTORY: Patient lives in [place], [place] with his wife and kids. Works as a . No sports or athletics.     TOXIC HABITS:  I spoke with Gomez today regarding tobacco use and they informed me that they do not use any tobacco products..    REVIEW OF SYSTEMS:  General: Denies fevers, chills, or night sweats.  Skin: Denies rashes or lesions.  Head: Denies headache or dizziness.  Eyes: Denies vision changes or eye pain.  Ears: Denies ear pain or decreased hearing.  Nose: Denies nose bleeding or sinus pain.  Mouth & Throat: Denies bleeding gums or sore throat.  Neck: Denies neck pain or stiffness.  Respiratory: Denies cough, wheezing, sob, or hemoptysis.  Cardiovascular: Denies chest pain, chest pressure, or palpitations.   Gastrointestinal: Denies abdominal pain, nausea, vomiting, diarrhea, or constipation.  Genitourinary: Denies difficulty or pain with urination.   Musculoskeletal: As noted above in the HPI, otherwise  "normal.  Neuro: Denies paralysis, weakness, paresthesias, or speech difficulty.  Lymphatics: Denies lymphadenopathy.  Psych: Denies anxiety, sadness, or irritability.    PHYSICAL EXAM:  Patient is 5' 7.25\" and weighs 184 lbs 0 oz. /68   Ht 1.708 m (5' 7.25\")   Wt 83.5 kg (184 lb)   BMI 28.60 kg/m    Constitutional: Well-developed, well-nourished, healthy appearing male.  Psychiatric:  Oriented to person, place and time.  Mood and affect congruent.  Skin: Warm, dry, and without rashes.  HEENT: Normocephalic, atraumatic. Extraocular movements intact. Moist mucous membranes.  Cardiac: Well perfused extremities, strong 2+ peripheral pulses. No edema.   Pulmonary: Non-labored respirations on room air without audible wheezes.   Abdomen: Soft, nontender.  Musculoskeletal: Patient ambulates with a slow, symmetric, and steady gait.  No gait antalgia.  No joint, bone or muscle abnormalities.  Active range of motion of the knees is 0 to 140 on the right, compared to 0 to 140 on the left. No effusion bilaterally. Lachman 1A, anterior drawer negative, pivot shift negative bilaterally.  The right knee has very mild medial joint line tenderness and a negative Monet.  He has no lateral joint line tenderness.  No varus or valgus instability in full extension or 30 degrees of flexion, posterior drawer or posterolateral instability bilaterally.  The neurovascular exam is intact and skin is normal.     IMAGING:   All imaging was personally reviewed by me.    Right knee AP and lateral radiographs dated 7/1/2021 were personally reviewed by me and demonstrate no significant arthrosis, fracture, or dislocation.    Right knee MRI scan without contrast dated 7/15/2021 was also personally reviewed by me.  This demonstrates a diminutive medial meniscus consistent with prior partial medial meniscectomy.  There is an irregular tear of the free edge of the posterior horn, but no displaced flap.  The root attachment is intact.  There " is minimal chondral wear of the medial compartment.  There is mild cartilage fissuring of the median patellar ridge and lateral patellar facet.  There is a trace knee joint effusion, otherwise the lateral meniscus, lateral compartment cartilage, cruciate ligaments, and medial and lateral collateral ligaments are intact.    IMPRESSION: 46 year old-year-old male, with right knee small medial meniscus tear in the setting of prior partial medial meniscectomy.     PLAN:   I had a nice discussion with Jose today.  Overall, his knee is in good shape despite having had a prior partial medial meniscectomy.  I suspect that he sustained a small recurrent tear of the medial meniscus.  There is no displaced flap.  He has been feeling better with no treatment.  I would recommend continued conservative management in the form of activity modification, relative rest, regular icing, and the use of intermittent anti-inflammatory medications over-the-counter as needed.  Again I would consider performing intra-articular corticosteroid injection in the future.  Could also consider repeat arthroscopic partial medial meniscectomy if his symptoms persist despite appropriate nonoperative management.  He may return to clinic on an as-needed basis moving forward.    At the conclusion of the office visit, Gomez verbally acknowledged that I answered all of his questions satisfactorily.

## 2021-08-03 NOTE — LETTER
8/3/2021         RE: Gomez Villegas  [address] [place] [address]        Dear Colleague,    Thank you for referring your patient, Gomez Villegas, to the [facility] [place]. Please see a copy of my visit note below.    CHIEF COMPLAINT: Right knee pain    DIAGNOSIS: Right knee small medial meniscus tear    OCCUPATION/SPORT:     HPI:   Gomez Villegas is a very pleasant 46 year old male who presents for evaluation of right knee pain. Symptoms started around 6/12/21. Unsure about a precipitating event but notes that he did take a fall around that time. The pain is located to the anteromedial knee. Worst pain is rated a 3 of 10, and current pain is rated at 1 of 10. Symptoms are worsened by sitting. Symptoms are improved with active. Associated symptoms include swelling, achy pain. Notably, the patient has had lateral meniscus repair 2001. No other concerns or complaints at this time.    PAST MEDICAL HISTORY:  1. None    CURRENT MEDICATIONS:  1. None    ALLERGIES:   NKDA    PAST SURGICAL HISTORY:  1. Right knee arthroscopic meniscal repair in the early 2000s (patient not sure where it was done)    FAMILY HISTORY: No known family history of bleeding, clotting, or anesthesia related complications.    SOCIAL HISTORY: Patient lives in [place], [place] with his wife and kids. Works as a . No sports or athletics.     TOXIC HABITS:  I spoke with Gomez today regarding tobacco use and they informed me that they do not use any tobacco products..    REVIEW OF SYSTEMS:  General: Denies fevers, chills, or night sweats.  Skin: Denies rashes or lesions.  Head: Denies headache or dizziness.  Eyes: Denies vision changes or eye pain.  Ears: Denies ear pain or decreased hearing.  Nose: Denies nose bleeding or sinus pain.  Mouth & Throat: Denies bleeding gums or sore throat.  Neck: Denies neck pain or stiffness.  Respiratory: Denies cough, wheezing, sob, or hemoptysis.  Cardiovascular:  "Denies chest pain, chest pressure, or palpitations.   Gastrointestinal: Denies abdominal pain, nausea, vomiting, diarrhea, or constipation.  Genitourinary: Denies difficulty or pain with urination.   Musculoskeletal: As noted above in the HPI, otherwise normal.  Neuro: Denies paralysis, weakness, paresthesias, or speech difficulty.  Lymphatics: Denies lymphadenopathy.  Psych: Denies anxiety, sadness, or irritability.    PHYSICAL EXAM:  Patient is 5' 7.25\" and weighs 184 lbs 0 oz. /68   Ht 1.708 m (5' 7.25\")   Wt 83.5 kg (184 lb)   BMI 28.60 kg/m    Constitutional: Well-developed, well-nourished, healthy appearing male.  Psychiatric:  Oriented to person, place and time.  Mood and affect congruent.  Skin: Warm, dry, and without rashes.  HEENT: Normocephalic, atraumatic. Extraocular movements intact. Moist mucous membranes.  Cardiac: Well perfused extremities, strong 2+ peripheral pulses. No edema.   Pulmonary: Non-labored respirations on room air without audible wheezes.   Abdomen: Soft, nontender.  Musculoskeletal: Patient ambulates with a slow, symmetric, and steady gait.  No gait antalgia.  No joint, bone or muscle abnormalities.  Active range of motion of the knees is 0 to 140 on the right, compared to 0 to 140 on the left. No effusion bilaterally. Lachman 1A, anterior drawer negative, pivot shift negative bilaterally.  The right knee has very mild medial joint line tenderness and a negative Monet.  He has no lateral joint line tenderness.  No varus or valgus instability in full extension or 30 degrees of flexion, posterior drawer or posterolateral instability bilaterally.  The neurovascular exam is intact and skin is normal.     IMAGING:   All imaging was personally reviewed by me.    Right knee AP and lateral radiographs dated 7/1/2021 were personally reviewed by me and demonstrate no significant arthrosis, fracture, or dislocation.    Right knee MRI scan without contrast dated 7/15/2021 was also " personally reviewed by me.  This demonstrates a diminutive medial meniscus consistent with prior partial medial meniscectomy.  There is an irregular tear of the free edge of the posterior horn, but no displaced flap.  The root attachment is intact.  There is minimal chondral wear of the medial compartment.  There is mild cartilage fissuring of the median patellar ridge and lateral patellar facet.  There is a trace knee joint effusion, otherwise the lateral meniscus, lateral compartment cartilage, cruciate ligaments, and medial and lateral collateral ligaments are intact.    IMPRESSION: 46 year old-year-old male, with right knee small medial meniscus tear in the setting of prior partial medial meniscectomy.     PLAN:   I had a nice discussion with Jose today.  Overall, his knee is in good shape despite having had a prior partial medial meniscectomy.  I suspect that he sustained a small recurrent tear of the medial meniscus.  There is no displaced flap.  He has been feeling better with no treatment.  I would recommend continued conservative management in the form of activity modification, relative rest, regular icing, and the use of intermittent anti-inflammatory medications over-the-counter as needed.  Again I would consider performing intra-articular corticosteroid injection in the future.  Could also consider repeat arthroscopic partial medial meniscectomy if his symptoms persist despite appropriate nonoperative management.  He may return to clinic on an as-needed basis moving forward.    At the conclusion of the office visit, Gomez verbally acknowledged that I answered all of his questions satisfactorily.        Again, thank you for allowing me to participate in the care of your patient.        Sincerely,        Narendra Ley MD

## 2021-10-16 ENCOUNTER — HEALTH MAINTENANCE LETTER (OUTPATIENT)
Age: 46
End: 2021-10-16

## 2021-12-08 ENCOUNTER — NURSE TRIAGE (OUTPATIENT)
Dept: NURSING | Facility: CLINIC | Age: 46
End: 2021-12-08
Payer: COMMERCIAL

## 2021-12-08 NOTE — TELEPHONE ENCOUNTER
Calling [facility] ask about rescheduling his covid shot out another week due to having covid.  Needs to be out another week due to having symptoms and the virus.    He will do it via [employer].    Rachael Mao RN  [facility] Nurse Advisor

## 2021-12-27 ENCOUNTER — IMMUNIZATION (OUTPATIENT)
Dept: NURSING | Facility: CLINIC | Age: 46
End: 2021-12-27
Payer: COMMERCIAL

## 2021-12-27 PROCEDURE — 0004A PR COVID VAC PFIZER DIL RECON 30 MCG/0.3 ML IM: CPT

## 2021-12-27 PROCEDURE — 91300 PR COVID VAC PFIZER DIL RECON 30 MCG/0.3 ML IM: CPT

## 2022-01-26 ENCOUNTER — OFFICE VISIT (OUTPATIENT)
Dept: FAMILY MEDICINE | Facility: CLINIC | Age: 47
End: 2022-01-26
Payer: COMMERCIAL

## 2022-01-26 VITALS
OXYGEN SATURATION: 98 % | WEIGHT: 177 LBS | BODY MASS INDEX: 27.78 KG/M2 | HEART RATE: 67 BPM | SYSTOLIC BLOOD PRESSURE: 100 MMHG | HEIGHT: 67 IN | DIASTOLIC BLOOD PRESSURE: 70 MMHG

## 2022-01-26 DIAGNOSIS — R41.3 MEMORY LOSS: ICD-10-CM

## 2022-01-26 DIAGNOSIS — G43.109 OCULAR MIGRAINE: ICD-10-CM

## 2022-01-26 DIAGNOSIS — Z00.00 ROUTINE PHYSICAL EXAMINATION: Primary | ICD-10-CM

## 2022-01-26 DIAGNOSIS — E80.6 HYPERBILIRUBINEMIA: ICD-10-CM

## 2022-01-26 DIAGNOSIS — E78.5 DYSLIPIDEMIA: ICD-10-CM

## 2022-01-26 DIAGNOSIS — S83.241D ACUTE MEDIAL MENISCUS TEAR OF RIGHT KNEE, SUBSEQUENT ENCOUNTER: ICD-10-CM

## 2022-01-26 DIAGNOSIS — E66.3 OVERWEIGHT: ICD-10-CM

## 2022-01-26 LAB
ALBUMIN SERPL-MCNC: 4.4 G/DL (ref 3.5–5)
ALP SERPL-CCNC: 69 U/L (ref 45–120)
ALT SERPL W P-5'-P-CCNC: 15 U/L (ref 0–45)
ANION GAP SERPL CALCULATED.3IONS-SCNC: 9 MMOL/L (ref 5–18)
AST SERPL W P-5'-P-CCNC: 15 U/L (ref 0–40)
BILIRUB SERPL-MCNC: 3.8 MG/DL (ref 0–1)
BUN SERPL-MCNC: 11 MG/DL (ref 8–22)
CALCIUM SERPL-MCNC: 9.4 MG/DL (ref 8.5–10.5)
CHLORIDE BLD-SCNC: 102 MMOL/L (ref 98–107)
CHOLEST SERPL-MCNC: 156 MG/DL
CO2 SERPL-SCNC: 29 MMOL/L (ref 22–31)
CREAT SERPL-MCNC: 0.82 MG/DL (ref 0.7–1.3)
ERYTHROCYTE [DISTWIDTH] IN BLOOD BY AUTOMATED COUNT: 12.3 % (ref 10–15)
FASTING STATUS PATIENT QL REPORTED: YES
GFR SERPL CREATININE-BSD FRML MDRD: >90 ML/MIN/1.73M2
GLUCOSE BLD-MCNC: 77 MG/DL (ref 70–125)
HCT VFR BLD AUTO: 46.9 % (ref 40–53)
HDLC SERPL-MCNC: 47 MG/DL
HGB BLD-MCNC: 16.7 G/DL (ref 13.3–17.7)
LDLC SERPL CALC-MCNC: 92 MG/DL
MCH RBC QN AUTO: 32.3 PG (ref 26.5–33)
MCHC RBC AUTO-ENTMCNC: 35.6 G/DL (ref 31.5–36.5)
MCV RBC AUTO: 91 FL (ref 78–100)
PLATELET # BLD AUTO: 149 10E3/UL (ref 150–450)
POTASSIUM BLD-SCNC: 4 MMOL/L (ref 3.5–5)
PROT SERPL-MCNC: 7.3 G/DL (ref 6–8)
RBC # BLD AUTO: 5.17 10E6/UL (ref 4.4–5.9)
SODIUM SERPL-SCNC: 140 MMOL/L (ref 136–145)
TRIGL SERPL-MCNC: 83 MG/DL
VIT B12 SERPL-MCNC: 354 PG/ML (ref 213–816)
WBC # BLD AUTO: 5.6 10E3/UL (ref 4–11)

## 2022-01-26 PROCEDURE — 82607 VITAMIN B-12: CPT | Performed by: FAMILY MEDICINE

## 2022-01-26 PROCEDURE — 80053 COMPREHEN METABOLIC PANEL: CPT | Performed by: FAMILY MEDICINE

## 2022-01-26 PROCEDURE — 85027 COMPLETE CBC AUTOMATED: CPT | Performed by: FAMILY MEDICINE

## 2022-01-26 PROCEDURE — 80061 LIPID PANEL: CPT | Performed by: FAMILY MEDICINE

## 2022-01-26 PROCEDURE — 99396 PREV VISIT EST AGE 40-64: CPT | Performed by: FAMILY MEDICINE

## 2022-01-26 PROCEDURE — 36415 COLL VENOUS BLD VENIPUNCTURE: CPT | Performed by: FAMILY MEDICINE

## 2022-01-26 ASSESSMENT — MIFFLIN-ST. JEOR: SCORE: 1640.46

## 2022-01-26 NOTE — PROGRESS NOTES
Assessment/Plan:     Routine physical examination  Routine healthcare maintenance.  Preventative cares reviewed.  Annual physical exams to continue.  Anticipate colonoscopy age 50 without personal or family history of colon cancer etc. unless recommendations change to screening colonoscopy beginning at age 45.    Overweight  Overweight status reviewed.  27 pound weight loss since physical December 29, 2020.  Weight goal remains less than 175 pounds initially, less than 170 pounds ideally.  Continue consistent exercise and healthy diet.    Acute medial meniscus tear of right knee, subsequent encounter  Posterior horn medial meniscus tear right knee with symptomatic improvement with conservative treatment and has resumed running activity typically 3 miles daily without concerns.  Had been seen by orthopedic specialist and has elected to monitor.    Dyslipidemia  History of described dyslipidemia and will update lipid cascade following recent 27 pound weight loss since December 29, 2020.  - Lipid panel reflex to direct LDL Fasting  - Lipid panel reflex to direct LDL Fasting    Hyperbilirubinemia  Has had bilirubin elevation historically asymptomatic as high as 2.4 with more recent bilirubin level at 1.5 likely Gilbert's syndrome etiology.  Ensure no evidence for hemolysis etc.  Lab assessment completed.  - Comprehensive metabolic panel  - Comprehensive metabolic panel    Ocular migraine  Ocular migraines described with prior episode April 2021, December 20, 2021 and January 14, 2022.  Ibuprofen 600 mg p.o. x1.  Vision described as improved after 30 minutes from onset without subsequent development of headache.  No other focal neurologic concerns.  Notify if increased frequency or severity.    Memory loss  Prior concerns for memory difficulties with short-term memory, mild.  Lab assessment historically had been fine with B12 level at lower end of normal range and will repeat vitamin B12 level today.  - CBC with  platelets  - Comprehensive metabolic panel  - Vitamin B12  - CBC with platelets  - Comprehensive metabolic panel  - Vitamin B12       The following high BMI interventions were performed this visit: encouragement to exercise, weight monitoring, weight loss from baseline weight and lifestyle education regarding diet.  Ensure ongoing efforts to achieve weight goal < 175 pounds initially, < 170 pounds ideally.           Subjective:     Gomez Villegas is a 47 year old male who presents for an annual exam.  In general doing well.  Did review prior concern for fall late June 2021 with right knee injury and hand injury noted.  Was seen January 1, 2021.  Subsequent MRI right knee showing posterior horn medial meniscus tear.  Conservative treatments however have allowed patient to resume activities without restriction.  Had seen orthopedic specialist initially.  Patient has chosen to defer arthroscopic exam and repair of medial meniscus tear at this time.  History of mild dyslipidemia.  Has lost 27 pounds since December 29, 2020.  History of mild dyslipidemia.  Hyperbilirubinemia consistent with Gilbert's syndrome.  Ocular migraine history also.  More recent episodes April 2021, December 20, 2021 and January 14, 2022.  Typically benefits from use of ibuprofen 600 mg for initial vision change without subsequent headaches described.  Denies significant reflux symptoms if he avoids spicy food.  Comprehensive review of systems as above otherwise all negative.       Healthy Habits:     Getting at least 3 servings of Calcium per day:  Yes    Bi-annual eye exam:  NO    Dental care twice a year:  Yes    Sleep apnea or symptoms of sleep apnea:  None and Sleep apnea    Diet:  Regular (no restrictions)    Frequency of exercise:  2-3 days/week    Duration of exercise:  15-30 minutes    Taking medications regularly:  Yes    Medication side effects:  Not applicable and None    PHQ-2 Total Score: 0    Additional concerns today:  No    "     \"Emily\"   2 children living - Sole Hernandez   1 daughter passed away 4/24/08 from meduloblastoma age 11   1 stepdaughter - Ashley   Mom - DM, CAD, high chol   Dad -   3 bros -   2 sis -   No tobacco use   EtOH - infrequent   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    Current Outpatient Medications   Medication Sig Dispense Refill     ibuprofen (ADVIL,MOTRIN) 200 MG tablet [IBUPROFEN (ADVIL,MOTRIN) 200 MG TABLET] Take 200 mg by mouth every 6 (six) hours as needed for pain.       No past medical history on file.  No past surgical history on file.  Patient has no known allergies.  No family history on file.  Social History     Socioeconomic History     Marital status:      Spouse name: Not on file     Number of children: Not on file     Years of education: Not on file     Highest education level: Not on file   Occupational History     Not on file   Tobacco Use     Smoking status: Never Smoker     Smokeless tobacco: Never Used   Substance and Sexual Activity     Alcohol use: No     Drug use: Not on file     Sexual activity: Not on file   Other Topics Concern     Not on file   Social History Narrative     Not on file     Social Determinants of Health     Financial Resource Strain: Not on file   Food Insecurity: Not on file   Transportation Needs: Not on file   Physical Activity: Not on file   Stress: Not on file   Social Connections: Not on file   Intimate " "Partner Violence: Not on file   Housing Stability: Not on file       Review of Systems  Comprehensive ROS: as above, otherwise all negative.           Objective:     /70   Pulse 67   Ht 1.708 m (5' 7.25\")   Wt 80.3 kg (177 lb)   SpO2 98%   BMI 27.52 kg/m    Body mass index is 27.52 kg/m .    Physical    General Appearance:    Alert, cooperative, no distress, appears stated age.  BMI 27.52.   Head:    Normocephalic, without obvious abnormality, atraumatic   Eyes:    PERRL, conjunctiva/corneas clear, EOM's intact, fundi     benign, both eyes        Ears:    Normal TM's and external ear canals, both ears   Nose:   Nares normal, septum midline, mucosa normal, no drainage    or sinus tenderness   Throat:   Lips, mucosa, and tongue normal; teeth and gums normal   Neck:   Supple, symmetrical, trachea midline, no adenopathy;        thyroid:  No enlargement/tenderness/nodules; no carotid    bruit or JVD   Back:     Symmetric, no curvature, ROM normal, no CVA tenderness   Lungs:     Clear to auscultation bilaterally, respirations unlabored   Chest wall:    No tenderness or deformity   Heart:    Regular rate and rhythm, S1 and S2 normal, no murmur, rub   or gallop   Abdomen:     Soft, non-tender, bowel sounds active all four quadrants,     no masses, no organomegaly.     Genitalia:    Normal male without lesion, discharge or tenderness.  No inguinal hernia noted.     Rectal:    deferred   Extremities:   Extremities normal, atraumatic, no cyanosis or edema   Pulses:   2+ and symmetric all extremities   Skin:   Skin color, texture, turgor normal, no rashes or lesions   Lymph nodes:   Cervical, supraclavicular, and axillary nodes normal   Neurologic:   CNII-XII intact. Normal strength, sensation and reflexes       throughout             EXAM: MR KNEE RIGHT W/O CONTRAST  LOCATION: [facility]  DATE/TIME: 7/15/2021 6:50 PM     INDICATION: Acute knee pain.  COMPARISON: 7/1/2021 radiographs.  TECHNIQUE: " Unenhanced.     FINDINGS:     MEDIAL COMPARTMENT: Irregular tear and volume loss in the free edge and middle third of the body of the medial meniscus. There is extension of the tear to involve the free edge and undersurface in the posterior horn of the medial meniscus. The root   attachments are intact. Minimal fissuring in the central aspect of the medial compartment articular cartilage.     LATERAL COMPARTMENT: No meniscal tear. Articular cartilage is preserved.     PATELLOFEMORAL COMPARTMENT: No patellar subluxation or tilting. Mild fissuring in the patellar apex and lateral patellar facet.     CRUCIATE LIGAMENTS: ACL and PCL are normal.     COLLATERAL LIGAMENTS: MCL and LCL are normal.     POSTEROMEDIAL CORNER: Distal semimembranosus tendon intact. Pes anserine tendons intact. Posteromedial corner complex ligaments are intact.     POSTEROLATERAL CORNER: Popliteus tendon intact. Distal biceps tendon intact. Posterolateral corner complex ligaments are intact.     EXTENSOR MECHANISM: Distal quadriceps and patella tendons intact. Retinacula intact.     JOINT SPACE: Trace joint effusion. No synovitis.     BONES: No fracture or concerning marrow replacing lesion.     SOFT TISSUES: Tiny partially ruptured popliteal cyst.                                                                       IMPRESSION:  1.  Tear and volume loss in the body and posterior horn of the medial meniscus.  2.  Minimal chondromalacia medial compartment .  3.  Mild chondromalacia in the patellar apex and lateral patellar facet.  4.  Trace knee joint effusion and tiny partially ruptured popliteal cyst.        This note has been dictated using voice recognition software and as a result may contain minor grammatical errors and unintended word substitutions.

## 2022-01-26 NOTE — PROGRESS NOTES
Answers for HPI/ROS submitted by the patient on 1/25/2022  Frequency of exercise:: 2-3 days/week  Getting at least 3 servings of Calcium per day:: Yes  Diet:: Regular (no restrictions)  Taking medications regularly:: Yes  Medication side effects:: Not applicable, None  Bi-annual eye exam:: NO  Dental care twice a year:: Yes  Sleep apnea or symptoms of sleep apnea:: None, Sleep apnea  Additional concerns today:: No  Duration of exercise:: 15-30 minutes

## 2022-01-27 DIAGNOSIS — E80.6 HYPERBILIRUBINEMIA: Primary | ICD-10-CM

## 2022-03-21 ENCOUNTER — OFFICE VISIT (OUTPATIENT)
Dept: GASTROENTEROLOGY | Facility: CLINIC | Age: 47
End: 2022-03-21
Attending: FAMILY MEDICINE
Payer: COMMERCIAL

## 2022-03-21 ENCOUNTER — LAB (OUTPATIENT)
Dept: LAB | Facility: CLINIC | Age: 47
End: 2022-03-21
Payer: COMMERCIAL

## 2022-03-21 VITALS
SYSTOLIC BLOOD PRESSURE: 132 MMHG | OXYGEN SATURATION: 98 % | WEIGHT: 186.6 LBS | DIASTOLIC BLOOD PRESSURE: 75 MMHG | HEIGHT: 67 IN | BODY MASS INDEX: 29.29 KG/M2 | HEART RATE: 73 BPM

## 2022-03-21 DIAGNOSIS — E80.6 HYPERBILIRUBINEMIA: ICD-10-CM

## 2022-03-21 LAB
BASOPHILS # BLD AUTO: 0 10E3/UL (ref 0–0.2)
BASOPHILS NFR BLD AUTO: 0 %
EOSINOPHIL # BLD AUTO: 0 10E3/UL (ref 0–0.7)
EOSINOPHIL NFR BLD AUTO: 0 %
ERYTHROCYTE [DISTWIDTH] IN BLOOD BY AUTOMATED COUNT: 12.4 % (ref 10–15)
HCT VFR BLD AUTO: 49 % (ref 40–53)
HGB BLD-MCNC: 16.9 G/DL (ref 13.3–17.7)
IMM GRANULOCYTES # BLD: 0 10E3/UL
IMM GRANULOCYTES NFR BLD: 0 %
LYMPHOCYTES # BLD AUTO: 1.7 10E3/UL (ref 0.8–5.3)
LYMPHOCYTES NFR BLD AUTO: 28 %
MCH RBC QN AUTO: 32.1 PG (ref 26.5–33)
MCHC RBC AUTO-ENTMCNC: 34.5 G/DL (ref 31.5–36.5)
MCV RBC AUTO: 93 FL (ref 78–100)
MONOCYTES # BLD AUTO: 0.5 10E3/UL (ref 0–1.3)
MONOCYTES NFR BLD AUTO: 8 %
NEUTROPHILS # BLD AUTO: 3.8 10E3/UL (ref 1.6–8.3)
NEUTROPHILS NFR BLD AUTO: 64 %
NRBC # BLD AUTO: 0 10E3/UL
NRBC BLD AUTO-RTO: 0 /100
PLATELET # BLD AUTO: 154 10E3/UL (ref 150–450)
RBC # BLD AUTO: 5.27 10E6/UL (ref 4.4–5.9)
RETICS # AUTO: 0.09 10E6/UL (ref 0.01–0.11)
RETICS/RBC NFR AUTO: 1.7 % (ref 0.8–2.7)
WBC # BLD AUTO: 6 10E3/UL (ref 4–11)

## 2022-03-21 PROCEDURE — 85060 BLOOD SMEAR INTERPRETATION: CPT | Performed by: PATHOLOGY

## 2022-03-21 PROCEDURE — 80076 HEPATIC FUNCTION PANEL: CPT

## 2022-03-21 PROCEDURE — 36415 COLL VENOUS BLD VENIPUNCTURE: CPT

## 2022-03-21 PROCEDURE — 85025 COMPLETE CBC W/AUTO DIFF WBC: CPT

## 2022-03-21 PROCEDURE — 85045 AUTOMATED RETICULOCYTE COUNT: CPT

## 2022-03-21 PROCEDURE — 99204 OFFICE O/P NEW MOD 45 MIN: CPT | Performed by: INTERNAL MEDICINE

## 2022-03-21 PROCEDURE — 86706 HEP B SURFACE ANTIBODY: CPT

## 2022-03-21 ASSESSMENT — PAIN SCALES - GENERAL: PAINLEVEL: NO PAIN (0)

## 2022-03-21 NOTE — NURSING NOTE
"Chief Complaint   Patient presents with     New Patient     Hyperbilirubinemia       Vitals:    03/21/22 1038   BP: 132/75   BP Location: Left arm   Patient Position: Sitting   Cuff Size: Adult Regular   Pulse: 73   SpO2: 98%   Weight: 84.6 kg (186 lb 9.6 oz)   Height: 1.708 m (5' 7.25\")       Body mass index is 29.01 kg/m .    Ivette Banks MA    "

## 2022-03-21 NOTE — PROGRESS NOTES
[facility]    New Patient Visit    Referring provider:  Alon Alcaraz    Chief complaint: Hyperbilirubinemia     HPI:  Mr. Villegas is a 47-year-old male relatively healthy was recently found on lab analysis to have isolated hyper bilirubinemia.  Talking with Mr. Villegas he denies any past history of viral hepatitis or other liver diseases.  He claims that he might have noted that his eyes might be quite thick yellow especially in the winter.  He was not able to correlate this with moment is of infection or considerable exertion.    Patient denies jaundice, abdominal distension, lower extremity edema, lethargy or confusion. No history of melena, hematemesis or hematochezia.  He also claims that he went down with his weight around 32 pounds in a year intentionally.      Denies any abdominal pain nausea vomiting he is having regular bowel movement this and he has not yet done his colonoscopy please note that the patient had Covid infection in November 2021 and he has since got vaccinated and has done also the booster. Patient denies fevers, sweats, chills.    Medical hx Surgical hx   Past Medical History:   Diagnosis Date     Abnormal liver function tests       No past surgical history on file.       Medications  No current outpatient medications on file.       Allergies  No Known Allergies    Family hx Social hx   Family History   Problem Relation Age of Onset     Coronary Artery Disease Mother      Hypertension Father       Social History     Tobacco Use     Smoking status: Never Smoker     Smokeless tobacco: Never Used   Substance Use Topics     Alcohol use: No     Drug use: None          Review of systems  Denies any infections, no fatigue or history of headaches.  Never had seizures.  He also denies any cough, shortness of breath, dyspnea on exertion or chest pain.  He denies any diabetes mellitus or thyroid issues.  He also denies any anemia or easy bruising.  He has no significant degenerative  "joint disease.  He also denies any psychiatric diagnosis.  He has also no eye or hearing problems.  He also denies any skin issues.  Otherwise a 10-point review of systems was negative.    Examination  /75 (BP Location: Left arm, Patient Position: Sitting, Cuff Size: Adult Regular)   Pulse 73   Ht 1.708 m (5' 7.25\")   Wt 84.6 kg (186 lb 9.6 oz)   SpO2 98%   BMI 29.01 kg/m    Body mass index is 29.01 kg/m .    Gen- well, NAD, A+Ox3, normal color  Eye- EOMI  ENT- MMM, normal oropharynx  Lym- no palpable lymphadenopathy  CVS- S1, S2 normal, no added sounds, RRR  RS- CTA  Abd- Not tender and hepatomegaly.  Extr- pulses good, no EDILSON  MS- hands normal- no clubbing  Neuro- A+Ox3, no asterixis  Skin- no rash or jaundice  Psych- normal mood    Laboratory  Lab Results   Component Value Date     01/26/2022    POTASSIUM 4.0 01/26/2022    CHLORIDE 102 01/26/2022    CO2 29 01/26/2022    BUN 11 01/26/2022    CR 0.82 01/26/2022       Lab Results   Component Value Date    BILITOTAL 3.8 01/26/2022    ALT 15 01/26/2022    AST 15 01/26/2022    ALKPHOS 69 01/26/2022       Lab Results   Component Value Date    ALBUMIN 4.4 01/26/2022    PROTTOTAL 7.3 01/26/2022        Lab Results   Component Value Date    WBC 5.6 01/26/2022    HGB 16.7 01/26/2022    MCV 91 01/26/2022     01/26/2022       No results found for: INR      Radiology    Assessment and Plan    Bakari is a 47 year old male with no significant past medical history and he was sent to us for an isolated hyperbilirubinemia seen in multiple occasions.  I do not see the breakdown of the bilirubin into direct and indirect and we will get that.  I do not see if the patient has also any past history of hemolytic anemia and he does not verbalize that.  We will get a peripheral smear.  In our differential here we include Gilbert's disease and hemolytic anemias.  Again is the latter is that his hemoglobin is normal.  We will also repeat his liver function tests basic " metabolic panel and CBC.  We will check if he has immunity to hepatitis B.  He was negative for hepatitis C.  We advised him to do his colonoscopy as he is now over 45.    For all his other medical issues and healthcare maintenance he will follow up with his primary care physician.  We will see him in 3 months    I spent 45 minutes on the encounter on March 21, 2020 in chart reviewing, history taking, physical examination, and documentation.  I also spent some of the time in coordination of care and counseling.    Shorty Velasco MD  Hepatology  [facility]

## 2022-03-21 NOTE — LETTER
3/21/2022         RE: Gomez Villegas  [address] [place] [address]        Dear Colleague,    Thank you for referring your patient, Gomez Villegas, to the [facility] [place]. Please see a copy of my visit note below.    [facility] Hepatology    New Patient Visit    Referring provider:  Alon Alcaraz    Chief complaint: Hyperbilirubinemia     HPI:  Mr. Villegas is a 47-year-old male relatively healthy was recently found on lab analysis to have isolated hyper bilirubinemia.  Talking with Mr. Villegas he denies any past history of viral hepatitis or other liver diseases.  He claims that he might have noted that his eyes might be quite thick yellow especially in the winter.  He was not able to correlate this with moment is of infection or considerable exertion.    Patient denies jaundice, abdominal distension, lower extremity edema, lethargy or confusion. No history of melena, hematemesis or hematochezia.  He also claims that he went down with his weight around 32 pounds in a year intentionally.      Denies any abdominal pain nausea vomiting he is having regular bowel movement this and he has not yet done his colonoscopy please note that the patient had Covid infection in November 2021 and he has since got vaccinated and has done also the booster. Patient denies fevers, sweats, chills.    Medical hx Surgical hx   Past Medical History:   Diagnosis Date     Abnormal liver function tests       No past surgical history on file.       Medications  No current outpatient medications on file.       Allergies  No Known Allergies    Family hx Social hx   Family History   Problem Relation Age of Onset     Coronary Artery Disease Mother      Hypertension Father       Social History     Tobacco Use     Smoking status: Never Smoker     Smokeless tobacco: Never Used   Substance Use Topics     Alcohol use: No     Drug use: None          Review of systems  Denies any infections, no fatigue or  "history of headaches.  Never had seizures.  He also denies any cough, shortness of breath, dyspnea on exertion or chest pain.  He denies any diabetes mellitus or thyroid issues.  He also denies any anemia or easy bruising.  He has no significant degenerative joint disease.  He also denies any psychiatric diagnosis.  He has also no eye or hearing problems.  He also denies any skin issues.  Otherwise a 10-point review of systems was negative.    Examination  /75 (BP Location: Left arm, Patient Position: Sitting, Cuff Size: Adult Regular)   Pulse 73   Ht 1.708 m (5' 7.25\")   Wt 84.6 kg (186 lb 9.6 oz)   SpO2 98%   BMI 29.01 kg/m    Body mass index is 29.01 kg/m .    Gen- well, NAD, A+Ox3, normal color  Eye- EOMI  ENT- MMM, normal oropharynx  Lym- no palpable lymphadenopathy  CVS- S1, S2 normal, no added sounds, RRR  RS- CTA  Abd- Not tender and hepatomegaly.  Extr- pulses good, no EDILSON  MS- hands normal- no clubbing  Neuro- A+Ox3, no asterixis  Skin- no rash or jaundice  Psych- normal mood    Laboratory  Lab Results   Component Value Date     01/26/2022    POTASSIUM 4.0 01/26/2022    CHLORIDE 102 01/26/2022    CO2 29 01/26/2022    BUN 11 01/26/2022    CR 0.82 01/26/2022       Lab Results   Component Value Date    BILITOTAL 3.8 01/26/2022    ALT 15 01/26/2022    AST 15 01/26/2022    ALKPHOS 69 01/26/2022       Lab Results   Component Value Date    ALBUMIN 4.4 01/26/2022    PROTTOTAL 7.3 01/26/2022        Lab Results   Component Value Date    WBC 5.6 01/26/2022    HGB 16.7 01/26/2022    MCV 91 01/26/2022     01/26/2022       No results found for: INR      Radiology    Assessment and Plan    Bakari is a 47 year old male with no significant past medical history and he was sent to us for an isolated hyperbilirubinemia seen in multiple occasions.  I do not see the breakdown of the bilirubin into direct and indirect and we will get that.  I do not see if the patient has also any past history of " hemolytic anemia and he does not verbalize that.  We will get a peripheral smear.  In our differential here we include Gilbert's disease and hemolytic anemias.  Again is the latter is that his hemoglobin is normal.  We will also repeat his liver function tests basic metabolic panel and CBC.  We will check if he has immunity to hepatitis B.  He was negative for hepatitis C.  We advised him to do his colonoscopy as he is now over 45.    For all his other medical issues and healthcare maintenance he will follow up with his primary care physician.  We will see him in 3 months    I spent 45 minutes on the encounter on March 21, 2020 in chart reviewing, history taking, physical examination, and documentation.  I also spent some of the time in coordination of care and counseling.    Shorty Velasco MD  Hepatology  [facility]          Again, thank you for allowing me to participate in the care of your patient.        Sincerely,        Shorty Velasco MD

## 2022-03-22 LAB
ALBUMIN SERPL-MCNC: 4.4 G/DL (ref 3.5–5)
ALP SERPL-CCNC: 70 U/L (ref 45–120)
ALT SERPL W P-5'-P-CCNC: 14 U/L (ref 0–45)
AST SERPL W P-5'-P-CCNC: 17 U/L (ref 0–40)
BILIRUB DIRECT SERPL-MCNC: 0.6 MG/DL
BILIRUB SERPL-MCNC: 2.4 MG/DL (ref 0–1)
HBV SURFACE AB SERPLBLD QL IA.RAPID: ABNORMAL
PATH REPORT.COMMENTS IMP SPEC: NORMAL
PATH REPORT.COMMENTS IMP SPEC: NORMAL
PATH REPORT.FINAL DX SPEC: NORMAL
PATH REPORT.MICROSCOPIC SPEC OTHER STN: NORMAL
PROT SERPL-MCNC: 7.3 G/DL (ref 6–8)

## 2022-04-01 ENCOUNTER — HOSPITAL ENCOUNTER (OUTPATIENT)
Dept: ULTRASOUND IMAGING | Facility: CLINIC | Age: 47
Discharge: HOME | End: 2022-04-01
Attending: INTERNAL MEDICINE | Admitting: INTERNAL MEDICINE
Payer: COMMERCIAL

## 2022-04-01 DIAGNOSIS — E80.6 HYPERBILIRUBINEMIA: ICD-10-CM

## 2022-04-01 PROCEDURE — 76700 US EXAM ABDOM COMPLETE: CPT

## 2022-07-11 ENCOUNTER — OFFICE VISIT (OUTPATIENT)
Dept: GASTROENTEROLOGY | Facility: CLINIC | Age: 47
End: 2022-07-11
Payer: COMMERCIAL

## 2022-07-11 VITALS
HEART RATE: 67 BPM | HEIGHT: 67 IN | BODY MASS INDEX: 27.97 KG/M2 | DIASTOLIC BLOOD PRESSURE: 66 MMHG | WEIGHT: 178.2 LBS | SYSTOLIC BLOOD PRESSURE: 109 MMHG | OXYGEN SATURATION: 100 %

## 2022-07-11 DIAGNOSIS — E80.6 HYPERBILIRUBINEMIA: Primary | ICD-10-CM

## 2022-07-11 PROCEDURE — 99213 OFFICE O/P EST LOW 20 MIN: CPT | Performed by: INTERNAL MEDICINE

## 2022-07-11 ASSESSMENT — PAIN SCALES - GENERAL: PAINLEVEL: NO PAIN (0)

## 2022-07-11 NOTE — PROGRESS NOTES
"[facility] Hepatology    Follow-up Visit      Consulting healthcare provider: Alon Alcaraz    Follow-up visit for hyperbilirubinemia    Subjective:  47 year old male with isolated hyperbilirubinemia.  He is otherwise healthy and our work-up did not disclose any viral hepatitis or other liver diseases.  Upon our interviewing he claims that he might have had jaundice especially in the winter he was not able as I have said before to correlate it with movement is of infection or considerable exertion.    Mr. Villegas does not show any signs of chronic liver disease.  In fact he denies jaundice, lower extremity edema, abdominal distension, lethargy or confusion. Patient denies melena, hematemesis or hematochezia.  As far as other GI symptoms are concerning he does not have any abdominal pain nausea or vomiting.  He is moving his bowels regularly.  Please note as I have said previously that he lost weight intentionally and now he is stable.    Patient denies fevers, sweats or chills.  For the COVID he had an infection in 2021 and after that he got vaccinated and had also boosters.    Medical hx Surgical hx   Past Medical History:   Diagnosis Date     Abnormal liver function tests       No past surgical history on file.       Medications  Prior to Admission medications    Not on File       Allergies  No Known Allergies    Review of systems  A 10-point review of systems was negative    Examination  /66 (BP Location: Left arm, Patient Position: Sitting, Cuff Size: Adult Regular)   Pulse 67   Ht 1.708 m (5' 7.25\")   Wt 80.8 kg (178 lb 3.2 oz)   SpO2 100%   BMI 27.70 kg/m    Body mass index is 27.7 kg/m .    Gen- well, NAD, A+Ox3, normal color  Lym- no palpable LAD  CVS- RRR  RS- CTA  Abd-not distended and not tender  Extr- hands normal, no EDILSON  Skin- no rash or jaundice  Neuro- no asterixis  Psych- normal mood    Laboratory  Lab Results   Component Value Date     01/26/2022    POTASSIUM 4.0 " 01/26/2022    CHLORIDE 102 01/26/2022    CO2 29 01/26/2022    BUN 11 01/26/2022    CR 0.82 01/26/2022       Lab Results   Component Value Date    BILITOTAL 2.4 03/21/2022    ALT 14 03/21/2022    AST 17 03/21/2022    ALKPHOS 70 03/21/2022       Lab Results   Component Value Date    ALBUMIN 4.4 03/21/2022    PROTTOTAL 7.3 03/21/2022        Lab Results   Component Value Date    WBC 6.0 03/21/2022    HGB 16.9 03/21/2022    MCV 93 03/21/2022     03/21/2022       No results found for: INR    Radiology    Assessment and plan:  47 year old male with isolated hyperbilirubinemia.  It is very violently indirect hyperbilirubinemia.  Work-up did not show other etiologies this included also a peripheral smear to check for hemolysis.  We think that this is most likely related with Gilbert's disease.  We will repeat 1 more time his liver function test this and if it stays the same as before then we will not do any further work-up regarding that.  He is currently 47 years old he qualifies to have a colonoscopy we ordered him to have that done.  For all his other medical issues he will follow with his primary care physician    I spent 30 minutes  on the encounter of July 11, 2022 in chart reviewing, history taking, physical examination and documentation.  Some of the time was also used for coordination of care and counseling.    Shorty Velasco MD  Hepatology  [facility]

## 2022-07-11 NOTE — LETTER
"    7/11/2022         RE: Gomez Villegas  [address] [place] [address]        Dear Colleague,    Thank you for referring your patient, Gomez Villegas, to the [facility] [place]. Please see a copy of my visit note below.    [facility] Hepatology    Follow-up Visit      Consulting healthcare provider: Alon Alcaraz    Follow-up visit for hyperbilirubinemia    Subjective:  47 year old male with isolated hyperbilirubinemia.  He is otherwise healthy and our work-up did not disclose any viral hepatitis or other liver diseases.  Upon our interviewing he claims that he might have had jaundice especially in the winter he was not able as I have said before to correlate it with movement is of infection or considerable exertion.    Mr. Villegas does not show any signs of chronic liver disease.  In fact he denies jaundice, lower extremity edema, abdominal distension, lethargy or confusion. Patient denies melena, hematemesis or hematochezia.  As far as other GI symptoms are concerning he does not have any abdominal pain nausea or vomiting.  He is moving his bowels regularly.  Please note as I have said previously that he lost weight intentionally and now he is stable.    Patient denies fevers, sweats or chills.  For the COVID he had an infection in 2021 and after that he got vaccinated and had also boosters.    Medical hx Surgical hx   Past Medical History:   Diagnosis Date     Abnormal liver function tests       No past surgical history on file.       Medications  Prior to Admission medications    Not on File       Allergies  No Known Allergies    Review of systems  A 10-point review of systems was negative    Examination  /66 (BP Location: Left arm, Patient Position: Sitting, Cuff Size: Adult Regular)   Pulse 67   Ht 1.708 m (5' 7.25\")   Wt 80.8 kg (178 lb 3.2 oz)   SpO2 100%   BMI 27.70 kg/m    Body mass index is 27.7 kg/m .    Gen- well, NAD, A+Ox3, normal color  Lym- no " palpable LAD  CVS- RRR  RS- CTA  Abd-not distended and not tender  Extr- hands normal, no EDILSON  Skin- no rash or jaundice  Neuro- no asterixis  Psych- normal mood    Laboratory  Lab Results   Component Value Date     01/26/2022    POTASSIUM 4.0 01/26/2022    CHLORIDE 102 01/26/2022    CO2 29 01/26/2022    BUN 11 01/26/2022    CR 0.82 01/26/2022       Lab Results   Component Value Date    BILITOTAL 2.4 03/21/2022    ALT 14 03/21/2022    AST 17 03/21/2022    ALKPHOS 70 03/21/2022       Lab Results   Component Value Date    ALBUMIN 4.4 03/21/2022    PROTTOTAL 7.3 03/21/2022        Lab Results   Component Value Date    WBC 6.0 03/21/2022    HGB 16.9 03/21/2022    MCV 93 03/21/2022     03/21/2022       No results found for: INR    Radiology    Assessment and plan:  47 year old male with isolated hyperbilirubinemia.  It is very violently indirect hyperbilirubinemia.  Work-up did not show other etiologies this included also a peripheral smear to check for hemolysis.  We think that this is most likely related with Gilbert's disease.  We will repeat 1 more time his liver function test this and if it stays the same as before then we will not do any further work-up regarding that.  He is currently 47 years old he qualifies to have a colonoscopy we ordered him to have that done.  For all his other medical issues he will follow with his primary care physician    I spent 30 minutes  on the encounter of July 11, 2022 in chart reviewing, history taking, physical examination and documentation.  Some of the time was also used for coordination of care and counseling.    Shorty Velasco MD  Hepatology  [facility]      Again, thank you for allowing me to participate in the care of your patient.        Sincerely,        Shorty Velasco MD

## 2022-07-11 NOTE — NURSING NOTE
"Chief Complaint   Patient presents with     Follow Up     Hyperbilirubinemia       Vitals:    07/11/22 1409   BP: 109/66   BP Location: Left arm   Patient Position: Sitting   Cuff Size: Adult Regular   Pulse: 67   SpO2: 100%   Weight: 80.8 kg (178 lb 3.2 oz)   Height: 1.708 m (5' 7.25\")       Body mass index is 27.7 kg/m .    Ivette [place], MARILYNF    "

## 2022-07-13 ENCOUNTER — TELEPHONE (OUTPATIENT)
Dept: GASTROENTEROLOGY | Facility: CLINIC | Age: 47
End: 2022-07-13

## 2022-07-13 NOTE — TELEPHONE ENCOUNTER
Screening Questions    BlueKIND OF PREP RedLOCATION [review exclusion criteria] GreenSEDATION TYPE      1. Are you active on mychart? Y    2. What insurance is in the chart? HP     3.   Ordering/Referring Provider: Shorty Velasco MD in  GASTROENTEROLOGY    4. BMI   (If greater than 40 review exclusion criteria [PAC APPT IF [MAC] @ [facility])  27.4  [If yes, BMI OVER 40-EXTENDED PREP]      **(Sedation review/consideration needed)**  Do you have a legal guardian or Medical Power of    and/or are you able to give consent for your medical care?   Y - SELF      5. Have you had a positive covid test in the last 90 days?   N -     6.  Are you currently on dialysis?   N [ If yes, G-PREP & HOSPITAL setting ONLY]     7.  Do you have chronic kidney disease?  N [ If yes, G-PREP ]    8.   Do you have a diagnosis of diabetes?   N   [ If yes, G-PREP ]    9.  On a regular basis do you go 3-5 days between bowel movements?   N   [ If yes, EXTENDED PREP]    10.  Are you taking any prescription pain medications on a routine schedule?    N -  [ If yes, EXTENDED PREP] [If yes, MAC]      11.   Do you have any chemical dependencies such as alcohol, street drugs, or methadone?    N [If yes, MAC]    12.   Do you have any history of post-traumatic stress syndrome, severe anxiety or history of psychosis?    N  [If yes, MAC]    13.  [FEMALES] Are you currently pregnant?     If yes, how many weeks?       Respiratory/Heart Screening:  [If yes to any of the following HOSPITAL setting only]     14. Do you have Pulmonary Hypertension [Lungs]?   N       15. Do you have UNCONTROLLED asthma?   N     16.  Do you use daily home oxygen?  N      17. Do you have mod to severe Obstructive Sleep Apnea?         (OKAY @ [facility]  [facility]  SH  PH  RI  MG - if pt is not on OXYGEN)  N      18.   Have you had a heart or lung transplant?   N      19.   Have you had a stroke or Transient ischemic attack (TIA - aka  mini stroke ) within 6 months?   (If yes, please review exclusion criteria)  N     20.   In the past 6 months, have you had any heart related issues including cardiomyopathy or heart attack?   N           If yes, did it require cardiac stenting or other implantable device?         21.   Do you have any implantable devices in your body (pacemaker, defib, LVAD)? (If yes, please review exclusion criteria)  N     22. Do you take nitroglycerin?   N           If yes, how often?   (if yes, HOSPITAL setting ONLY)    23.  Are you currently taking any blood thinners?    [If yes, INFORM patient to follw up w/ ORDERING PROVIDER FOR BRIDGING INSTRUCTIONS]     N    24.   Do you transfer independently?                (If NO, please HOSPITAL setting ONLY)  Y    25.   Preferred LOCAL Pharmacy for Pre Prescription:      [facility][id number] [address] AT [facility] [address]    Scheduling Details  (Please ask for phone number if not scheduled by patient)      Caller : Gomez Villegas  Date of Procedure: 12/29/22  Surgeon: HENRI  Location: [facility]        Sedation Type: CS l   Conscious Sedation- Needs  for 6 hours after the procedure  MAC/General-Needs  for 24 hours after procedure    N :[Pre-op Required] at [facility]  SH  MG and OR for MAC sedation   (advise patient they will need a pre-op WITH IN 30 DAYS of procedure date)     Type of Procedure Scheduled:   Lower Endoscopy [Colonoscopy]    Which Colonoscopy Prep was Sent?:   MIRALAX -     KIMBERLY CF PATIENTS & GROEN'S PATIENTS NEEDS EXTENDED PREP       Informed patient they will need an adult  Y  Cannot take any type of public or medical transportation alone    Pre-Procedure Covid test to be completed at [facility] or Externally: Y  **INFORMED OF HOME TESTING & LAB OPTION**        Confirmed Nurse will call to complete assessment Y    Additional comments: N

## 2022-10-01 ENCOUNTER — HEALTH MAINTENANCE LETTER (OUTPATIENT)
Age: 47
End: 2022-10-01

## 2022-12-16 ENCOUNTER — TELEPHONE (OUTPATIENT)
Dept: GASTROENTEROLOGY | Facility: CLINIC | Age: 47
End: 2022-12-16

## 2022-12-16 DIAGNOSIS — E80.6 HYPERBILIRUBINEMIA: Primary | ICD-10-CM

## 2022-12-16 RX ORDER — BISACODYL 5 MG
TABLET, DELAYED RELEASE (ENTERIC COATED) ORAL
Qty: 4 TABLET | Refills: 0 | Status: SHIPPED | OUTPATIENT
Start: 2022-12-16 | End: 2022-12-29

## 2022-12-16 NOTE — TELEPHONE ENCOUNTER
Attempted to contact patient regarding upcoming colonoscopy  procedure on 12/29/22 for pre assessment questions. No answer.     Left message to return call to [phone] #4    Discuss Covid policy.     Pre op exam scheduled: N/A    Arrival time: 0630    [facility]; [address] [place] [address], 5th Floor, [place] [address]    Sedation type: Conscious sedation     Anticoagulants: No    Electronic implanted devices? No    Diabetic? No    Indication for procedure: Hyperbilirubinemia    Bowel prep recommendation: Standard Golytely     Prep instructions sent via [employer]. Bowel prep script sent to     [facility] #[id number] - [place], [place] - [address] [facility] [address] AT [facility] [address] & [address] WALLACE RN

## 2022-12-19 NOTE — TELEPHONE ENCOUNTER
Pre assessment questions completed for upcoming colonoscopy  procedure scheduled on 12/29/22    COVID policy reviewed.     Reviewed procedural arrival time 0630 and facility location [facility]; [address] [place] [address], [address] Floor, [place] [address]    Designated  policy reviewed. Instructed to have someone stay 6 hours post procedure.     Anticoagulation/blood thinners? No    Electronic implanted devices? No    Reviewed procedure prep instructions.     Patient verbalized understanding and had no questions or concerns at this time.    Lucy Garvey RN

## 2022-12-28 NOTE — TELEPHONE ENCOUNTER
Pt called and stated he had a covid exposure and was asking about the policy.    Writer instructed pt to take at home test morning of procedure and take picture of results next to his photo ID. If test is positive please reschedule.     Pt verbalized understanding.     BALJIT WALLACE RN on 12/28/2022 at 8:07 AM

## 2022-12-29 ENCOUNTER — ANESTHESIA EVENT (OUTPATIENT)
Dept: SURGERY | Facility: AMBULATORY SURGERY CENTER | Age: 47
End: 2022-12-29
Payer: COMMERCIAL

## 2022-12-29 ENCOUNTER — HOSPITAL ENCOUNTER (OUTPATIENT)
Facility: AMBULATORY SURGERY CENTER | Age: 47
Discharge: HOME | End: 2022-12-29
Attending: INTERNAL MEDICINE
Payer: COMMERCIAL

## 2022-12-29 ENCOUNTER — ANESTHESIA (OUTPATIENT)
Dept: SURGERY | Facility: AMBULATORY SURGERY CENTER | Age: 47
End: 2022-12-29
Payer: COMMERCIAL

## 2022-12-29 VITALS
HEIGHT: 67 IN | BODY MASS INDEX: 27.94 KG/M2 | DIASTOLIC BLOOD PRESSURE: 75 MMHG | TEMPERATURE: 97.3 F | SYSTOLIC BLOOD PRESSURE: 104 MMHG | WEIGHT: 178 LBS | OXYGEN SATURATION: 100 % | RESPIRATION RATE: 16 BRPM | HEART RATE: 60 BPM

## 2022-12-29 VITALS — HEART RATE: 62 BPM

## 2022-12-29 DIAGNOSIS — Z12.11 SCREENING FOR MALIGNANT NEOPLASM OF COLON: Primary | ICD-10-CM

## 2022-12-29 LAB — COLONOSCOPY: NORMAL

## 2022-12-29 PROCEDURE — 45378 DIAGNOSTIC COLONOSCOPY: CPT | Mod: 33

## 2022-12-29 RX ORDER — PROPOFOL 10 MG/ML
INJECTION, EMULSION INTRAVENOUS PRN
Status: DISCONTINUED | OUTPATIENT
Start: 2022-12-29 | End: 2022-12-29

## 2022-12-29 RX ORDER — LIDOCAINE 40 MG/G
CREAM TOPICAL
Status: DISCONTINUED | OUTPATIENT
Start: 2022-12-29 | End: 2022-12-30 | Stop reason: HOSPADM

## 2022-12-29 RX ORDER — SODIUM CHLORIDE, SODIUM LACTATE, POTASSIUM CHLORIDE, CALCIUM CHLORIDE 600; 310; 30; 20 MG/100ML; MG/100ML; MG/100ML; MG/100ML
INJECTION, SOLUTION INTRAVENOUS CONTINUOUS
Status: DISCONTINUED | OUTPATIENT
Start: 2022-12-29 | End: 2022-12-30 | Stop reason: HOSPADM

## 2022-12-29 RX ORDER — PROPOFOL 10 MG/ML
INJECTION, EMULSION INTRAVENOUS CONTINUOUS PRN
Status: DISCONTINUED | OUTPATIENT
Start: 2022-12-29 | End: 2022-12-29

## 2022-12-29 RX ORDER — ONDANSETRON 2 MG/ML
4 INJECTION INTRAMUSCULAR; INTRAVENOUS
Status: DISCONTINUED | OUTPATIENT
Start: 2022-12-29 | End: 2022-12-30 | Stop reason: HOSPADM

## 2022-12-29 RX ORDER — LIDOCAINE HYDROCHLORIDE 20 MG/ML
INJECTION, SOLUTION INFILTRATION; PERINEURAL PRN
Status: DISCONTINUED | OUTPATIENT
Start: 2022-12-29 | End: 2022-12-29

## 2022-12-29 RX ADMIN — PROPOFOL 50 MG: 10 INJECTION, EMULSION INTRAVENOUS at 07:26

## 2022-12-29 RX ADMIN — PROPOFOL 150 MCG/KG/MIN: 10 INJECTION, EMULSION INTRAVENOUS at 07:26

## 2022-12-29 RX ADMIN — SODIUM CHLORIDE, SODIUM LACTATE, POTASSIUM CHLORIDE, CALCIUM CHLORIDE: 600; 310; 30; 20 INJECTION, SOLUTION INTRAVENOUS at 07:26

## 2022-12-29 RX ADMIN — PROPOFOL 50 MG: 10 INJECTION, EMULSION INTRAVENOUS at 07:29

## 2022-12-29 RX ADMIN — LIDOCAINE HYDROCHLORIDE 100 MG: 20 INJECTION, SOLUTION INFILTRATION; PERINEURAL at 07:26

## 2022-12-29 NOTE — ANESTHESIA POSTPROCEDURE EVALUATION
Patient: Gomez Villegas    Procedure: Procedure(s):  COLONOSCOPY       Anesthesia Type:  MAC    Note:  Disposition: Outpatient   Postop Pain Control: Uneventful            Sign Out: Well controlled pain   PONV: No   Neuro/Psych: Uneventful            Sign Out: Acceptable/Baseline neuro status   Airway/Respiratory: Uneventful            Sign Out: Acceptable/Baseline resp. status   CV/Hemodynamics: Uneventful            Sign Out: Acceptable CV status; No obvious hypovolemia; No obvious fluid overload   Other NRE: NONE   DID A NON-ROUTINE EVENT OCCUR? No           Last vitals:  Vitals Value Taken Time   /75 12/29/22 0810   Temp     Pulse 60 12/29/22 0810   Resp 16 12/29/22 0810   SpO2 100 % 12/29/22 0810       Electronically Signed By: Michael Arroyo DO  December 29, 2022  8:33 AM

## 2022-12-29 NOTE — H&P
"Gomez Villegas  [id number]  male  47 year old      Reason for procedure/surgery: Screening colonoscopy    Patient Active Problem List   Diagnosis   (none) - all problems resolved or deleted       Past Surgical History:  History reviewed. No pertinent surgical history.    Past Medical History:   Past Medical History:   Diagnosis Date     Abnormal liver function tests      Gilbert disease        Social History:   Social History     Tobacco Use     Smoking status: Never     Smokeless tobacco: Never   Substance Use Topics     Alcohol use: No       Family History:   Family History   Problem Relation Age of Onset     Coronary Artery Disease Mother      Hypertension Father        Allergies: No Known Allergies    Active Medications:   No current outpatient medications on file.       Systemic Review:   CONSTITUTIONAL: NEGATIVE for fever, chills, change in weight  ENT/MOUTH: NEGATIVE for ear, mouth and throat problems  RESP: NEGATIVE for significant cough or SOB  CV: NEGATIVE for chest pain, palpitations or peripheral edema    Physical Examination:   Vital Signs: /67   Temp 97.3  F (36.3  C) (Temporal)   Resp 16   Ht 1.702 m (5' 7\")   Wt 80.7 kg (178 lb)   SpO2 99%   BMI 27.88 kg/m    GENERAL: healthy, alert and no distress  NECK: no adenopathy, no asymmetry, masses, or scars  RESP: lungs clear to auscultation - no rales, rhonchi or wheezes  CV: regular rate and rhythm, normal S1 S2, no S3 or S4, no murmur, click or rub, no peripheral edema and peripheral pulses strong  ABDOMEN: soft, nontender, no hepatosplenomegaly, no masses and bowel sounds normal  MS: no gross musculoskeletal defects noted, no edema    Plan: Appropriate to proceed as scheduled.      Shorty Velasco MD  12/29/2022    PCP:  No Ref-Primary, Physician    "

## 2022-12-29 NOTE — DISCHARGE INSTRUCTIONS
Discharge Instructions after Colonoscopy    Today you had a Colonoscopy Activity and Diet  You were given medicine for pain. You may be dizzy or sleepy.  For 24 hours:   Do not drive or use heavy equipment.   Do not make important decisions.   Do not drink any alcohol.  You may return to your normal diet and medicines.    Discomfort   Air was placed in your colon during the exam in order to see it. Walking helps to pass the air.   You may take Tylenol (acetaminophen) for pain unless your doctor has told you not to.  Do not take aspirin or ibuprofen (Advil, Motrin, or other anti-inflammatory  drugs) for 3-5 days.    Follow-up  If we took small tissue samples or polyps to study. Your doctor will call you with the results  within two weeks.    When to call:    Call right away if you have:   Unusual pain in belly or chest pain not relieved with passing air.   More than 1 to 2 Tablespoons of bleeding from your rectum.   Fever above 100.6  F (37.5  C).    If you have severe pain, bleeding, or shortness of breath, go to an emergency room.    If you have questions, call:  Monday to Friday, 8 a.m. to 4:30 p.m.  Central Scheduling Department: [phone]    After hours  Hospital: [phone] (Ask for the GI fellow on calllll

## 2022-12-29 NOTE — ANESTHESIA CARE TRANSFER NOTE
Patient: Gomez Villegas    Procedure: Procedure(s):  COLONOSCOPY       Diagnosis: Hyperbilirubinemia [E80.6]  Diagnosis Additional Information: No value filed.    Anesthesia Type:   MAC     Note:    Oropharynx: oropharynx clear of all foreign objects  Level of Consciousness: drowsy  Oxygen Supplementation: room air    Independent Airway: airway patency satisfactory and stable  Dentition: dentition unchanged  Vital Signs Stable: post-procedure vital signs reviewed and stable  Report to RN Given: handoff report given  Patient transferred to: Phase II    Handoff Report: Identifed the Patient, Identified the Reponsible Provider, Reviewed the pertinent medical history, Discussed the surgical course, Reviewed Intra-OP anesthesia mangement and issues during anesthesia, Set expectations for post-procedure period and Allowed opportunity for questions and acknowledgement of understanding      Vitals:  Vitals Value Taken Time   BP     Temp     Pulse     Resp     SpO2         Electronically Signed By: MANOLO Tate CRNA  December 29, 2022  7:55 AM

## 2022-12-29 NOTE — ANESTHESIA PREPROCEDURE EVALUATION
Anesthesia Pre-Procedure Evaluation    Patient: Gomez Villegas   MRN: [id number] : 1975        Procedure : Procedure(s):  COLONOSCOPY          Past Medical History:   Diagnosis Date     Abnormal liver function tests      Gilbert disease       History reviewed. No pertinent surgical history.   No Known Allergies   Social History     Tobacco Use     Smoking status: Never     Smokeless tobacco: Never   Substance Use Topics     Alcohol use: No      Wt Readings from Last 1 Encounters:   22 80.7 kg (178 lb)        Anesthesia Evaluation   Pt has not had prior anesthetic         ROS/MED HX  ENT/Pulmonary:  - neg pulmonary ROS     Neurologic:  - neg neurologic ROS     Cardiovascular:  - neg cardiovascular ROS     METS/Exercise Tolerance: >4 METS    Hematologic:  - neg hematologic  ROS     Musculoskeletal:  - neg musculoskeletal ROS     GI/Hepatic: Comment: [place] disease      Renal/Genitourinary:  - neg Renal ROS     Endo:  - neg endo ROS     Psychiatric/Substance Use:  - neg psychiatric ROS     Infectious Disease:  - neg infectious disease ROS     Malignancy:  - neg malignancy ROS     Other:            Physical Exam    Airway  airway exam normal      Mallampati: II   TM distance: > 3 FB   Neck ROM: full   Mouth opening: > 3 cm    Respiratory Devices and Support         Dental       (+) chipped      Cardiovascular   cardiovascular exam normal          Pulmonary   pulmonary exam normal                OUTSIDE LABS:  CBC:   Lab Results   Component Value Date    WBC 6.0 2022    WBC 5.6 2022    HGB 16.9 2022    HGB 16.7 2022    HCT 49.0 2022    HCT 46.9 2022     2022     (L) 2022     BMP:   Lab Results   Component Value Date     2022     2020    POTASSIUM 4.0 2022    POTASSIUM 4.2 2020    CHLORIDE 102 2022    CHLORIDE 106 2020    CO2 29 2022    CO2 28 2020    BUN 11 2022    BUN 28 (H)  12/29/2020    CR 0.82 01/26/2022    CR 0.91 12/29/2020    GLC 77 01/26/2022    GLC 91 12/29/2020     COAGS: No results found for: PTT, INR, FIBR  POC: No results found for: BGM, HCG, HCGS  HEPATIC:   Lab Results   Component Value Date    ALBUMIN 4.4 03/21/2022    PROTTOTAL 7.3 03/21/2022    ALT 14 03/21/2022    AST 17 03/21/2022    ALKPHOS 70 03/21/2022    BILITOTAL 2.4 (H) 03/21/2022     OTHER:   Lab Results   Component Value Date    JOHN 9.4 01/26/2022    TSH 0.89 12/18/2019    CRP 0.1 07/09/2020    SED 2 07/09/2020       Anesthesia Plan    ASA Status:  2   NPO Status:  NPO Appropriate    Anesthesia Type: MAC.   Induction: Intravenous.   Maintenance: TIVA.        Consents            Postoperative Care    Pain management: IV analgesics, Multi-modal analgesia.   PONV prophylaxis: Ondansetron (or other 5HT-3), Background Propofol Infusion     Comments:                Michael Arroyo DO

## 2023-05-14 ENCOUNTER — HEALTH MAINTENANCE LETTER (OUTPATIENT)
Age: 48
End: 2023-05-14

## 2023-11-03 ENCOUNTER — OFFICE VISIT (OUTPATIENT)
Dept: FAMILY MEDICINE | Facility: CLINIC | Age: 48
End: 2023-11-03
Payer: COMMERCIAL

## 2023-11-03 VITALS
WEIGHT: 173 LBS | OXYGEN SATURATION: 99 % | HEART RATE: 75 BPM | SYSTOLIC BLOOD PRESSURE: 110 MMHG | BODY MASS INDEX: 27.15 KG/M2 | TEMPERATURE: 97.6 F | HEIGHT: 67 IN | RESPIRATION RATE: 19 BRPM | DIASTOLIC BLOOD PRESSURE: 60 MMHG

## 2023-11-03 DIAGNOSIS — M79.661 BILATERAL CALF PAIN: ICD-10-CM

## 2023-11-03 DIAGNOSIS — E66.3 OVERWEIGHT: ICD-10-CM

## 2023-11-03 DIAGNOSIS — E80.6 HYPERBILIRUBINEMIA: ICD-10-CM

## 2023-11-03 DIAGNOSIS — M79.662 BILATERAL CALF PAIN: ICD-10-CM

## 2023-11-03 DIAGNOSIS — N45.1 ACUTE EPIDIDYMITIS: Primary | ICD-10-CM

## 2023-11-03 DIAGNOSIS — N52.9 DIFFICULTY ATTAINING ERECTION: ICD-10-CM

## 2023-11-03 LAB
ALBUMIN SERPL BCG-MCNC: 4.6 G/DL (ref 3.5–5.2)
ALP SERPL-CCNC: 70 U/L (ref 40–129)
ALT SERPL W P-5'-P-CCNC: 23 U/L (ref 0–70)
ANION GAP SERPL CALCULATED.3IONS-SCNC: 9 MMOL/L (ref 7–15)
AST SERPL W P-5'-P-CCNC: 27 U/L (ref 0–45)
BILIRUB SERPL-MCNC: 3 MG/DL
BUN SERPL-MCNC: 15.5 MG/DL (ref 6–20)
CALCIUM SERPL-MCNC: 9.7 MG/DL (ref 8.6–10)
CHLORIDE SERPL-SCNC: 104 MMOL/L (ref 98–107)
CHOLEST SERPL-MCNC: 185 MG/DL
CK SERPL-CCNC: 139 U/L (ref 39–308)
CREAT SERPL-MCNC: 0.9 MG/DL (ref 0.67–1.17)
DEPRECATED HCO3 PLAS-SCNC: 28 MMOL/L (ref 22–29)
EGFRCR SERPLBLD CKD-EPI 2021: >90 ML/MIN/1.73M2
ERYTHROCYTE [DISTWIDTH] IN BLOOD BY AUTOMATED COUNT: 12.1 % (ref 10–15)
GLUCOSE SERPL-MCNC: 93 MG/DL (ref 70–99)
HCT VFR BLD AUTO: 47.7 % (ref 40–53)
HDLC SERPL-MCNC: 59 MG/DL
HGB BLD-MCNC: 16.8 G/DL (ref 13.3–17.7)
LDLC SERPL CALC-MCNC: 110 MG/DL
MAGNESIUM SERPL-MCNC: 2.1 MG/DL (ref 1.7–2.3)
MCH RBC QN AUTO: 32.9 PG (ref 26.5–33)
MCHC RBC AUTO-ENTMCNC: 35.2 G/DL (ref 31.5–36.5)
MCV RBC AUTO: 93 FL (ref 78–100)
NONHDLC SERPL-MCNC: 126 MG/DL
PLATELET # BLD AUTO: 153 10E3/UL (ref 150–450)
POTASSIUM SERPL-SCNC: 4.7 MMOL/L (ref 3.4–5.3)
PROT SERPL-MCNC: 7.4 G/DL (ref 6.4–8.3)
RBC # BLD AUTO: 5.11 10E6/UL (ref 4.4–5.9)
SODIUM SERPL-SCNC: 141 MMOL/L (ref 135–145)
TRIGL SERPL-MCNC: 81 MG/DL
WBC # BLD AUTO: 7.7 10E3/UL (ref 4–11)

## 2023-11-03 PROCEDURE — 83735 ASSAY OF MAGNESIUM: CPT | Performed by: FAMILY MEDICINE

## 2023-11-03 PROCEDURE — 85027 COMPLETE CBC AUTOMATED: CPT | Performed by: FAMILY MEDICINE

## 2023-11-03 PROCEDURE — 99214 OFFICE O/P EST MOD 30 MIN: CPT | Performed by: FAMILY MEDICINE

## 2023-11-03 PROCEDURE — 82550 ASSAY OF CK (CPK): CPT | Performed by: FAMILY MEDICINE

## 2023-11-03 PROCEDURE — 80061 LIPID PANEL: CPT | Performed by: FAMILY MEDICINE

## 2023-11-03 PROCEDURE — 36415 COLL VENOUS BLD VENIPUNCTURE: CPT | Performed by: FAMILY MEDICINE

## 2023-11-03 PROCEDURE — 80053 COMPREHEN METABOLIC PANEL: CPT | Performed by: FAMILY MEDICINE

## 2023-11-03 RX ORDER — DOXYCYCLINE HYCLATE 100 MG
100 TABLET ORAL 2 TIMES DAILY
Qty: 20 TABLET | Refills: 0 | Status: SHIPPED | OUTPATIENT
Start: 2023-11-03 | End: 2023-11-13

## 2023-11-03 ASSESSMENT — PAIN SCALES - GENERAL: PAINLEVEL: MILD PAIN (3)

## 2023-11-03 NOTE — PROGRESS NOTES
"Assessment/Plan:    Acute epididymitis  Empiric management for presumed acute epididymitis right side.  Doxycycline 100 mg twice daily x10 days.  CBC obtained.  Notify of persistent or recurrent issues.  - doxycycline hyclate (VIBRA-TABS) 100 MG tablet  Dispense: 20 tablet; Refill: 0  - CBC with platelets  - CBC with platelets    Bilateral calf pain  Bilateral calf pain with likely overuse with running 3 to 6 miles daily.  Check CK level as well as magnesium level.  Gentle stretching.  Ensure adequate hydration.  Crosstraining avoid exacerbating triggers.  - Magnesium  - CK total  - Magnesium  - CK total    Hyperbilirubinemia  Update bilirubin level with history of hyperbilirubinemia.  Ensure no evidence for hemolysis.  - Comprehensive metabolic panel  - CBC with platelets  - Comprehensive metabolic panel  - CBC with platelets    Overweight  Overweight status.  Maintain weight goal less than 170 pounds ideally.  Lipid cascade updated as well as fasting glucose today.  - Lipid panel reflex to direct LDL Fasting  - Lipid panel reflex to direct LDL Fasting    Difficulty obtaining erection  Has had issues over the past month associate with right groin pain.  Anticipate self-limited with further improvement otherwise will notify of need for further treatment option.           Subjective:    Gomez Villegas is seen today for right groin pain.  Happened about a month ago.  Got worse and was going to go to urgent care.  Chose not to unfortunately did seem to improve however still has intermittent concerns with right groin pain.  Some difficulties with erections associated with this over past month.  Calf pain and Achilles tendon discomfort.  Does run 3 to 6 miles most days.  Has had elevated bilirubin as well.  Patient is fasting would like fasting labs done today.  Comprehensive review of systems as above otherwise all negative       \"Emily\"   2 children living - David Sole   1 daughter passed away 4/24/08 from " "meduloblastoma age 11   1 stepdaughter - Ashley   Mom - DM, CAD, high chol   Dad -   3 bros -   2 sis -   No tobacco use   EtOH - infrequent   [employer] - retired after daughter's diagnosis   [place] x 2011 ( for all [employer]) - prior [employer] shop ([employer])   Surgeries: s/p wisdom teeth, right knee arthroscopy due to meniscus injury 11/03   cell phone [phone]       Past Surgical History:   Procedure Laterality Date    COLONOSCOPY N/A 12/29/2022    Procedure: COLONOSCOPY;  Surgeon: Shorty Velasco MD;  [place] [facility] [place]        Family History   Problem Relation Age of Onset    Coronary Artery Disease Mother     Hypertension Father         Past Medical History:   Diagnosis Date    Abnormal liver function tests     Gilbert disease         Social History     Tobacco Use    Smoking status: Never    Smokeless tobacco: Never   Substance Use Topics    Alcohol use: No        Current Outpatient Medications   Medication Sig Dispense Refill    doxycycline hyclate (VIBRA-TABS) 100 MG tablet Take 1 tablet (100 mg) by mouth 2 times daily for 10 days 20 tablet 0          Objective:    Vitals:    11/03/23 0859   BP: 110/60   Pulse: 75   Resp: 19   Temp: 97.6  F (36.4  C)   SpO2: 99%   Weight: 78.5 kg (173 lb)   Height: 1.702 m (5' 7\")      Body mass index is 27.1 kg/m .    Alert.  No apparent distress.  Right groin exam without lymphadenopathy.  No inguinal hernia.  Testicles descended bilaterally without asymmetry or induration.  Mild tenderness with the right epididymis.  Vas deferens does appear normal.      This note has been dictated using voice recognition software and as a result may contain minor grammatical errors and unintended word substitutions.       Answers submitted by the patient for this visit:  General Questionnaire (Submitted on 11/1/2023)  Chief Complaint: Chronic problems general questions HPI Form  What is the reason for your visit today? : Pain in groin area / testicle  How " many servings of fruits and vegetables do you eat daily?: 2-3  On average, how many sweetened beverages do you drink each day (Examples: soda, juice, sweet tea, etc.  Do NOT count diet or artificially sweetened beverages)?: 1  How many minutes a day do you exercise enough to make your heart beat faster?: 30 to 60  How many days a week do you exercise enough to make your heart beat faster?: 5  How many days per week do you miss taking your medication?: 0

## 2023-11-18 ENCOUNTER — MYC MEDICAL ADVICE (OUTPATIENT)
Dept: FAMILY MEDICINE | Facility: CLINIC | Age: 48
End: 2023-11-18
Payer: COMMERCIAL

## 2023-11-18 DIAGNOSIS — N45.1 ACUTE EPIDIDYMITIS: Primary | ICD-10-CM

## 2023-11-20 NOTE — TELEPHONE ENCOUNTER
Dr Alcaraz,  Please see MyChart message from patient needing provider's direction.  Please respond directly to patient if appropriate.    ANDREW MorrisN, RN  [facility]

## 2024-01-08 ENCOUNTER — TELEPHONE (OUTPATIENT)
Dept: UROLOGY | Facility: CLINIC | Age: 49
End: 2024-01-08

## 2024-01-08 ENCOUNTER — VIRTUAL VISIT (OUTPATIENT)
Dept: UROLOGY | Facility: CLINIC | Age: 49
End: 2024-01-08
Attending: FAMILY MEDICINE
Payer: COMMERCIAL

## 2024-01-08 DIAGNOSIS — N45.1 ACUTE EPIDIDYMITIS: ICD-10-CM

## 2024-01-08 DIAGNOSIS — N50.82 SCROTAL PAIN: Primary | ICD-10-CM

## 2024-01-08 PROCEDURE — 99203 OFFICE O/P NEW LOW 30 MIN: CPT | Mod: 95 | Performed by: PHYSICIAN ASSISTANT

## 2024-01-08 ASSESSMENT — PAIN SCALES - GENERAL: PAINLEVEL: MILD PAIN (3)

## 2024-01-08 NOTE — NURSING NOTE
Is the patient currently in the state of [place]? YES    Visit mode:VIDEO    If the visit is dropped, the patient can be reconnected by: VIDEO VISIT: Text to cell phone:   Telephone Information:   Mobile [phone]       Will anyone else be joining the visit? NO  (If patient encounters technical issues they should call [phone])    How would you like to obtain your AVS? MyChart    Are changes needed to the allergy or medication list? No    Reason for visit: Consult ([facility] - Acute epididymitis)    Bonny SHANKAR

## 2024-01-08 NOTE — LETTER
1/8/2024       RE: Gomez Villegas  [address] [place] [address]     Dear Colleague,    Thank you for referring your patient, Gomez Villegas, to the [facility] at [facility]. Please see a copy of my visit note below.    How would you like to obtain your AVS? MyChart  If the video visit is dropped, the invitation should be resent by:   Will anyone else be joining your video visit? No          Video-Visit Details    Type of service:  Video Visit     Originating Location (pt. Location): Home    Distant Location (provider location):  On-site  Platform used for Video Visit: [employer]  Start time: 12:26pm  End time:12:38pm    CC: right scrotal discomfort    HPI:  Gomez Villegas is a pleasant 48 year old male who presents in consultation from Dr. Alcaraz for evaluation of the above. Treated for right epididymitis in Nov 2023 with Doxy x 10 days. No urinary symptoms. No constipation. Feels as if he empties most of the time. A little bit of a slower stream. Run every day.     Thought had maybe pulled his muscle in the groin, but more loacized in the right scrotum. No swelling.   Can have some trouble with firmness of erection  Past Medical History:   Diagnosis Date    Abnormal liver function tests     Gilbert disease        Past Surgical History:   Procedure Laterality Date    COLONOSCOPY N/A 12/29/2022    Procedure: COLONOSCOPY;  Surgeon: Shorty Velasco MD;  [place] [facility] [place]       Social History     Socioeconomic History    Marital status:      Spouse name: Not on file    Number of children: Not on file    Years of education: Not on file    Highest education level: Not on file   Occupational History    Not on file   Tobacco Use    Smoking status: Never    Smokeless tobacco: Never   Substance and Sexual Activity    Alcohol use: No    Drug use: Not on file    Sexual activity: Not on file   Other Topics Concern    Not on file    Social History Narrative    Not on file     Social Determinants of Health     Financial Resource Strain: Low Risk  (11/1/2023)    Financial Resource Strain     Within the past 12 months, have you or your family members you live with been unable to get utilities (heat, electricity) when it was really needed?: No   Food Insecurity: Low Risk  (11/1/2023)    Food Insecurity     Within the past 12 months, did you worry that your food would run out before you got money to buy more?: No     Within the past 12 months, did the food you bought just not last and you didn t have money to get more?: No   Transportation Needs: Low Risk  (11/1/2023)    Transportation Needs     Within the past 12 months, has lack of transportation kept you from medical appointments, getting your medicines, non-medical meetings or appointments, work, or from getting things that you need?: No   Physical Activity: Not on file   Stress: Not on file   Social Connections: Not on file   Interpersonal Safety: Low Risk  (11/3/2023)    Interpersonal Safety     Do you feel physically and emotionally safe where you currently live?: Yes     Within the past 12 months, have you been hit, slapped, kicked or otherwise physically hurt by someone?: No     Within the past 12 months, have you been humiliated or emotionally abused in other ways by your partner or ex-partner?: No   Housing Stability: Low Risk  (11/1/2023)    Housing Stability     Do you have housing? : Yes     Are you worried about losing your housing?: No       Family History   Problem Relation Age of Onset    Coronary Artery Disease Mother     Hypertension Father        No Known Allergies    No current outpatient medications on file.     No current facility-administered medications for this visit.         PEx:   There were no vitals taken for this visit.    PSYCH: NAD  EYES: EOMI  MOUTH: MMM  NEURO: AAO x3      A/P: Gomez Villegas is a 48 year old male with right-scrotal discomfort  -US will call with  results  -If normal, PFPT jennie Brandt PA-C  [facility]      25 minutes spent on the date of the encounter doing chart review, review of outside records, review of test results, interpretation of tests, patient visit and documentation

## 2024-01-08 NOTE — TELEPHONE ENCOUNTER
[facility] Call Center    Phone Message    May a detailed message be left on voicemail: yes     Reason for Call: Other: Pt states he was told to have a testicular US done this week. He states the order was sent to [facility] and was told not to have the US done until April. He is calling to clarify when he should get the US done. Please follow-up with pt.      Action Taken: Message routed to:  Other: [facility]    Travel Screening: Not Applicable

## 2024-01-08 NOTE — TELEPHONE ENCOUNTER
Called patient and informed him  That he should have it done now rather than later.     Remedios Kumari LPN

## 2024-01-08 NOTE — PROGRESS NOTES
How would you like to obtain your AVS? MyChart  If the video visit is dropped, the invitation should be resent by:   Will anyone else be joining your video visit? No          Video-Visit Details    Type of service:  Video Visit     Originating Location (pt. Location): Home    Distant Location (provider location):  On-site  Platform used for Video Visit: [employer]  Start time: 12:26pm  End time:12:38pm    CC: right scrotal discomfort    HPI:  Gomez Villegas is a pleasant 48 year old male who presents in consultation from Dr. Alcaraz for evaluation of the above. Treated for right epididymitis in Nov 2023 with Doxy x 10 days. No urinary symptoms. No constipation. Feels as if he empties most of the time. A little bit of a slower stream. Run every day.     Thought had maybe pulled his muscle in the groin, but more loacized in the right scrotum. No swelling.   Can have some trouble with firmness of erection  Past Medical History:   Diagnosis Date    Abnormal liver function tests     Gilbert disease        Past Surgical History:   Procedure Laterality Date    COLONOSCOPY N/A 12/29/2022    Procedure: COLONOSCOPY;  Surgeon: Shorty Velasco MD;  Location: [facility] [place]       Social History     Socioeconomic History    Marital status:      Spouse name: Not on file    Number of children: Not on file    Years of education: Not on file    Highest education level: Not on file   Occupational History    Not on file   Tobacco Use    Smoking status: Never    Smokeless tobacco: Never   Substance and Sexual Activity    Alcohol use: No    Drug use: Not on file    Sexual activity: Not on file   Other Topics Concern    Not on file   Social History Narrative    Not on file     Social Determinants of Health     Financial Resource Strain: Low Risk  (11/1/2023)    Financial Resource Strain     Within the past 12 months, have you or your family members you live with been unable to get utilities (heat, electricity) when it was really  needed?: No   Food Insecurity: Low Risk  (11/1/2023)    Food Insecurity     Within the past 12 months, did you worry that your food would run out before you got money to buy more?: No     Within the past 12 months, did the food you bought just not last and you didn t have money to get more?: No   Transportation Needs: Low Risk  (11/1/2023)    Transportation Needs     Within the past 12 months, has lack of transportation kept you from medical appointments, getting your medicines, non-medical meetings or appointments, work, or from getting things that you need?: No   Physical Activity: Not on file   Stress: Not on file   Social Connections: Not on file   Interpersonal Safety: Low Risk  (11/3/2023)    Interpersonal Safety     Do you feel physically and emotionally safe where you currently live?: Yes     Within the past 12 months, have you been hit, slapped, kicked or otherwise physically hurt by someone?: No     Within the past 12 months, have you been humiliated or emotionally abused in other ways by your partner or ex-partner?: No   Housing Stability: Low Risk  (11/1/2023)    Housing Stability     Do you have housing? : Yes     Are you worried about losing your housing?: No       Family History   Problem Relation Age of Onset    Coronary Artery Disease Mother     Hypertension Father        No Known Allergies    No current outpatient medications on file.     No current facility-administered medications for this visit.         PEx:   There were no vitals taken for this visit.    PSYCH: NAD  EYES: EOMI  MOUTH: MMM  NEURO: AAO x3      A/P: Gomez Villegas is a 48 year old male with right-scrotal discomfort  -US will call with results  -If normal, PFPT jennie Brandt PA-C  [facility]        25 minutes spent on the date of the encounter doing chart review, review of outside records, review of test results, interpretation of tests, patient visit and documentation

## 2024-01-12 ENCOUNTER — HOSPITAL ENCOUNTER (OUTPATIENT)
Dept: ULTRASOUND IMAGING | Facility: CLINIC | Age: 49
Discharge: HOME | End: 2024-01-12
Attending: PHYSICIAN ASSISTANT | Admitting: PHYSICIAN ASSISTANT
Payer: COMMERCIAL

## 2024-01-12 DIAGNOSIS — N50.82 SCROTAL PAIN: ICD-10-CM

## 2024-01-12 PROCEDURE — 76870 US EXAM SCROTUM: CPT

## 2024-01-12 PROCEDURE — 93976 VASCULAR STUDY: CPT

## 2024-05-14 SDOH — HEALTH STABILITY: PHYSICAL HEALTH: ON AVERAGE, HOW MANY MINUTES DO YOU ENGAGE IN EXERCISE AT THIS LEVEL?: 30 MIN

## 2024-05-14 SDOH — HEALTH STABILITY: PHYSICAL HEALTH: ON AVERAGE, HOW MANY DAYS PER WEEK DO YOU ENGAGE IN MODERATE TO STRENUOUS EXERCISE (LIKE A BRISK WALK)?: 6 DAYS

## 2024-05-14 ASSESSMENT — SOCIAL DETERMINANTS OF HEALTH (SDOH): HOW OFTEN DO YOU GET TOGETHER WITH FRIENDS OR RELATIVES?: ONCE A WEEK

## 2024-05-15 ENCOUNTER — OFFICE VISIT (OUTPATIENT)
Dept: FAMILY MEDICINE | Facility: CLINIC | Age: 49
End: 2024-05-15
Payer: COMMERCIAL

## 2024-05-15 VITALS
SYSTOLIC BLOOD PRESSURE: 100 MMHG | RESPIRATION RATE: 14 BRPM | WEIGHT: 181 LBS | OXYGEN SATURATION: 99 % | TEMPERATURE: 97.4 F | HEART RATE: 70 BPM | DIASTOLIC BLOOD PRESSURE: 60 MMHG | HEIGHT: 67 IN | BODY MASS INDEX: 28.41 KG/M2

## 2024-05-15 DIAGNOSIS — E80.6 HYPERBILIRUBINEMIA: ICD-10-CM

## 2024-05-15 DIAGNOSIS — Z00.00 ROUTINE PHYSICAL EXAMINATION: Primary | ICD-10-CM

## 2024-05-15 DIAGNOSIS — Z01.84 IMMUNITY STATUS TESTING: ICD-10-CM

## 2024-05-15 DIAGNOSIS — E66.3 OVERWEIGHT: ICD-10-CM

## 2024-05-15 LAB
ERYTHROCYTE [DISTWIDTH] IN BLOOD BY AUTOMATED COUNT: 12 % (ref 10–15)
HCT VFR BLD AUTO: 47.3 % (ref 40–53)
HGB BLD-MCNC: 16.7 G/DL (ref 13.3–17.7)
MCH RBC QN AUTO: 32.4 PG (ref 26.5–33)
MCHC RBC AUTO-ENTMCNC: 35.3 G/DL (ref 31.5–36.5)
MCV RBC AUTO: 92 FL (ref 78–100)
PLATELET # BLD AUTO: 155 10E3/UL (ref 150–450)
RBC # BLD AUTO: 5.16 10E6/UL (ref 4.4–5.9)
WBC # BLD AUTO: 7.6 10E3/UL (ref 4–11)

## 2024-05-15 PROCEDURE — 99213 OFFICE O/P EST LOW 20 MIN: CPT | Performed by: FAMILY MEDICINE

## 2024-05-15 PROCEDURE — 86706 HEP B SURFACE ANTIBODY: CPT | Performed by: FAMILY MEDICINE

## 2024-05-15 PROCEDURE — 36415 COLL VENOUS BLD VENIPUNCTURE: CPT | Performed by: FAMILY MEDICINE

## 2024-05-15 PROCEDURE — 85027 COMPLETE CBC AUTOMATED: CPT | Performed by: FAMILY MEDICINE

## 2024-05-15 PROCEDURE — 80053 COMPREHEN METABOLIC PANEL: CPT | Performed by: FAMILY MEDICINE

## 2024-05-15 PROCEDURE — 99396 PREV VISIT EST AGE 40-64: CPT | Performed by: FAMILY MEDICINE

## 2024-05-15 ASSESSMENT — PAIN SCALES - GENERAL: PAINLEVEL: NO PAIN (0)

## 2024-05-15 NOTE — PROGRESS NOTES
"    Assessment/Plan:     Routine physical examination  Routine healthcare maintenance.  Preventative cares reviewed.  Annual physical exams to continue.  Prior colonoscopy December 29, 2022 with 10-year follow-up recommendation.  Immunizations reviewed and up-to-date.    Overweight  Weight goal remains less than 175 pounds initially, less than 170 pounds ideally.  History of mild dyslipidemia.  Nonfasting.  Cholesterol results near goal November 3, 2023.  Fasting glucose 93 at that time.    Hyperbilirubinemia  Hyperbilirubinemia.  Check CBC and comprehensive metabolic panel.  Prior bilirubin of 3.0.  - Comprehensive metabolic panel  - CBC with platelets  - Comprehensive metabolic panel  - CBC with platelets    Immunity status testing  Immunity status testing for hepatitis B.  Prior testing was equivocal.  - Hepatitis B Surface Antibody  - Hepatitis B Surface Antibody                 Subjective:     Gomez Villegas is a 49 year old male who presents for an annual exam.  In general doing well.  Difficulty maintaining weight less than 170 pounds.  Some weight gain over the winter months.  Has had hyperbilirubinemia in the past question Gilbert's syndrome etiology with prior bilirubin 3.0.  No history of hemolysis.  Prior colonoscopy normal December 29, 2022.  Has had episodes of epididymitis in the past without current concern.  Some difficulty maintaining adequate erection but intermittent concern only and does not require intervention.  Has had hepatitis B surface antibody immunity status testing in the past that was equivocal.  Denies high risk behavior.  Comprehensive review of systems as above otherwise all negative.       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    No current outpatient medications on file.     Past Medical History:   Diagnosis Date    Abnormal liver function tests     Gilbert disease      Past Surgical History:   Procedure Laterality Date    COLONOSCOPY N/A 12/29/2022    Procedure: COLONOSCOPY;  Surgeon: Shorty Velasco MD;  [place] [facility] [place] has no known allergies.  Family History   Problem Relation Age of Onset    Coronary Artery Disease Mother     Hypertension Father      Social History     Socioeconomic History    Marital status:      Spouse name: Not on file    Number of children: Not on file    Years of education: Not on file    Highest education level: Not on file   Occupational History    Not on file   Tobacco Use    Smoking status: Never    Smokeless tobacco: Never   Substance and Sexual Activity    Alcohol use: No    Drug use: Not on file    Sexual activity: Not on file   Other Topics Concern    Not on file   Social History Narrative    Not on file     Social Determinants of Health     Financial Resource Strain: Low Risk  (5/14/2024)    Financial Resource Strain     Within the past 12 months, have you or your family members you live with been unable to get utilities (heat, electricity) when it was really needed?: No   Food Insecurity: Low Risk  (5/14/2024)    Food Insecurity     Within the past 12 months, did you worry that your food would run  "out before you got money to buy more?: No     Within the past 12 months, did the food you bought just not last and you didn t have money to get more?: No   Transportation Needs: Low Risk  (5/14/2024)    Transportation Needs     Within the past 12 months, has lack of transportation kept you from medical appointments, getting your medicines, non-medical meetings or appointments, work, or from getting things that you need?: No   Physical Activity: Sufficiently Active (5/14/2024)    Exercise Vital Sign     Days of Exercise per Week: 6 days     Minutes of Exercise per Session: 30 min   Stress: No Stress Concern Present (5/14/2024)    [employer] - Occupational Stress Questionnaire     Feeling of Stress : Not at all   Social Connections: Unknown (5/14/2024)    Social Connection and Isolation Panel [NHANES]     Frequency of Communication with Friends and Family: Not on file     Frequency of Social Gatherings with Friends and Family: Once a week     Attends Christian Services: Not on file     Active Member of Clubs or Organizations: Not on file     Attends Club or Organization Meetings: Not on file     Marital Status: Not on file   Interpersonal Safety: Low Risk  (5/15/2024)    Interpersonal Safety     Do you feel physically and emotionally safe where you currently live?: Yes     Within the past 12 months, have you been hit, slapped, kicked or otherwise physically hurt by someone?: No     Within the past 12 months, have you been humiliated or emotionally abused in other ways by your partner or ex-partner?: No   Housing Stability: Low Risk  (5/14/2024)    Housing Stability     Do you have housing? : Yes     Are you worried about losing your housing?: No       Review of Systems  Comprehensive ROS: as above, otherwise all negative.           Objective:     /60   Pulse 70   Temp 97.4  F (36.3  C)   Resp 14   Ht 1.702 m (5' 7\")   Wt 82.1 kg (181 lb)   SpO2 99%   BMI 28.35 kg/m    Body " mass index is 28.35 kg/m .    Physical    General Appearance:    Alert, cooperative, no distress, appears stated age.  BMI 28.35   Head:    Normocephalic, without obvious abnormality, atraumatic   Eyes:    PERRL, conjunctiva/corneas clear, EOM's intact, fundi     benign, both eyes        Ears:    Normal TM's and external ear canals, both ears   Nose:   Nares normal, septum midline, mucosa normal, no drainage    or sinus tenderness   Throat:   Lips, mucosa, and tongue normal; teeth and gums normal   Neck:   Supple, symmetrical, trachea midline, no adenopathy;        thyroid:  No enlargement/tenderness/nodules; no carotid    bruit or JVD   Back:     Symmetric, no curvature, ROM normal, no CVA tenderness   Lungs:     Clear to auscultation bilaterally, respirations unlabored   Chest wall:    No tenderness or deformity   Heart:    Regular rate and rhythm, S1 and S2 normal, no murmur, rub   or gallop   Abdomen:     Soft, non-tender, bowel sounds active all four quadrants,     no masses, no organomegaly.     Genitalia:    Normal male without lesion, discharge or tenderness.  No inguinal hernia noted.     Rectal:    deferred   Extremities:   Extremities normal, atraumatic, no cyanosis or edema   Pulses:   2+ and symmetric all extremities   Skin:   Skin color, texture, turgor normal, no rashes or lesions   Lymph nodes:   Cervical, supraclavicular, and axillary nodes normal   Neurologic:   CNII-XII intact. Normal strength, sensation and reflexes       throughout                This note has been dictated using voice recognition software and as a result may contain minor grammatical errors and unintended word substitutions.

## 2024-05-16 LAB
ALBUMIN SERPL BCG-MCNC: 4.7 G/DL (ref 3.5–5.2)
ALP SERPL-CCNC: 70 U/L (ref 40–150)
ALT SERPL W P-5'-P-CCNC: 17 U/L (ref 0–70)
ANION GAP SERPL CALCULATED.3IONS-SCNC: 13 MMOL/L (ref 7–15)
AST SERPL W P-5'-P-CCNC: 26 U/L (ref 0–45)
BILIRUB SERPL-MCNC: 2.3 MG/DL
BUN SERPL-MCNC: 20.4 MG/DL (ref 6–20)
CALCIUM SERPL-MCNC: 9.4 MG/DL (ref 8.6–10)
CHLORIDE SERPL-SCNC: 102 MMOL/L (ref 98–107)
CREAT SERPL-MCNC: 0.82 MG/DL (ref 0.67–1.17)
DEPRECATED HCO3 PLAS-SCNC: 23 MMOL/L (ref 22–29)
EGFRCR SERPLBLD CKD-EPI 2021: >90 ML/MIN/1.73M2
GLUCOSE SERPL-MCNC: 77 MG/DL (ref 70–99)
HBV SURFACE AB SERPL IA-ACNC: 3.68 M[IU]/ML
HBV SURFACE AB SERPL IA-ACNC: NONREACTIVE M[IU]/ML
POTASSIUM SERPL-SCNC: 4.2 MMOL/L (ref 3.4–5.3)
PROT SERPL-MCNC: 7.7 G/DL (ref 6.4–8.3)
SODIUM SERPL-SCNC: 138 MMOL/L (ref 135–145)

## 2025-05-23 ENCOUNTER — RESULTS FOLLOW-UP (OUTPATIENT)
Dept: FAMILY MEDICINE | Facility: CLINIC | Age: 50
End: 2025-05-23

## (undated) DEVICE — KIT ENDO TURNOVER/PROCEDURE CARRY-ON 101822

## (undated) DEVICE — GOWN IMPERVIOUS 2XL BLUE

## (undated) DEVICE — TUBING SUCTION 12"X1/4" N612

## (undated) DEVICE — SPECIMEN CONTAINER 3OZ W/FORMALIN 59901

## (undated) DEVICE — SNARE CAPIVATOR ROUND COLD SNR BX10 M00561101

## (undated) DEVICE — SOL WATER IRRIG 500ML BOTTLE 2F7113

## (undated) DEVICE — SUCTION MANIFOLD NEPTUNE 2 SYS 1 PORT 702-025-000